# Patient Record
Sex: FEMALE | Race: BLACK OR AFRICAN AMERICAN | Employment: OTHER | RURAL
[De-identification: names, ages, dates, MRNs, and addresses within clinical notes are randomized per-mention and may not be internally consistent; named-entity substitution may affect disease eponyms.]

---

## 2017-02-17 ENCOUNTER — OFFICE VISIT (OUTPATIENT)
Dept: FAMILY MEDICINE CLINIC | Age: 82
End: 2017-02-17

## 2017-02-17 VITALS
BODY MASS INDEX: 23.09 KG/M2 | HEART RATE: 75 BPM | WEIGHT: 117.6 LBS | SYSTOLIC BLOOD PRESSURE: 138 MMHG | DIASTOLIC BLOOD PRESSURE: 58 MMHG | OXYGEN SATURATION: 97 % | RESPIRATION RATE: 16 BRPM | TEMPERATURE: 97.6 F | HEIGHT: 60 IN

## 2017-02-17 DIAGNOSIS — I25.2 HISTORY OF MYOCARDIAL INFARCT AT AGE GREATER THAN 60 YEARS: Primary | ICD-10-CM

## 2017-02-17 DIAGNOSIS — I10 ESSENTIAL HYPERTENSION: ICD-10-CM

## 2017-02-17 DIAGNOSIS — E78.00 PURE HYPERCHOLESTEROLEMIA: ICD-10-CM

## 2017-02-17 RX ORDER — ASPIRIN 81 MG/1
TABLET ORAL DAILY
COMMUNITY
End: 2017-08-14 | Stop reason: SDUPTHER

## 2017-02-17 NOTE — PROGRESS NOTES
Danielle Mason is a 80 y.o. female presenting for/with:    Medication Evaluation    HPI:  Hypertension. Blood pressures have been stable. Management at last visit included continuing current regimen, but pt has been feeling orthostatic with use of diovan, so has been holding that for past couple weeks. Current regimen: (angiotensin II receptor antagonist- held), beta-blocker, thiazide, and calcium channel blocker. Symptoms include mild orthostasis, not when off . Patient denies chest pain, palpitations, peripheral edema. Lab review:   Lab Results   Component Value Date/Time    Sodium 142 12/06/2016 09:46 AM    Potassium 4.0 12/06/2016 09:46 AM    Chloride 102 12/06/2016 09:46 AM    CO2 26 12/06/2016 09:46 AM    Glucose 89 12/06/2016 09:46 AM    BUN 29 12/06/2016 09:46 AM    Creatinine 1.46 12/06/2016 09:46 AM    BUN/Creatinine ratio 20 12/06/2016 09:46 AM    GFR est AA 39 12/06/2016 09:46 AM    GFR est non-AA 34 12/06/2016 09:46 AM    Calcium 10.1 12/06/2016 09:46 AM     Hyperlipidemia and ASCVD. On lipitor 40. Taran well. No myalgias, arthralgias, unusual weakness. Lab Results   Component Value Date/Time    Cholesterol, total 196 10/12/2016 08:28 AM    HDL Cholesterol 79 10/12/2016 08:28 AM    LDL, calculated 99 10/12/2016 08:28 AM    VLDL, calculated 18 10/12/2016 08:28 AM    Triglyceride 92 10/12/2016 08:28 AM       Lab Results   Component Value Date/Time    ALT (SGPT) 20 10/12/2016 08:28 AM    AST (SGOT) 24 01/04/2016 10:15 AM    Alk. phosphatase 94 01/04/2016 10:15 AM    Bilirubin, total 0.3 01/04/2016 10:15 AM     PMH, SH, Medications/Allergies: reviewed, on chart.     ROS:  Constitutional: No fever, chills or weight loss  Respiratory: No cough, SOB   CV: No chest pain or Palpitations    Visit Vitals    /58 (BP 1 Location: Right arm, BP Patient Position: Sitting)    Pulse 75    Temp 97.6 °F (36.4 °C) (Oral)    Resp 16    Ht 5' (1.524 m)    Wt 117 lb 9.6 oz (53.3 kg)    SpO2 97%    BMI 22.97 kg/m2     Wt Readings from Last 3 Encounters:   02/17/17 117 lb 9.6 oz (53.3 kg)   12/06/16 119 lb (54 kg)   11/29/16 119 lb (54 kg)     BP Readings from Last 3 Encounters:   02/17/17 138/58   12/06/16 120/58   11/29/16 109/54       Physical Examination: General appearance - alert, well appearing, and in no distress  Mental status - alert, oriented to person, place, and time  Eyes - pupils equal and reactive, extraocular eye movements intact  ENT - bilateral external ears and nose normal. Normal lips  Neck - supple, no significant adenopathy, no thyromegaly or mass  Lymphatics - no palpable lymphadenopathy, no hepatosplenomegaly  Chest - clear to auscultation, no wheezes, rales or rhonchi, symmetric air entry  Heart - normal rate, regular rhythm, normal S1, S2, no murmurs, rubs, clicks or gallops  Extremities - peripheral pulses normal, no pedal edema, no clubbing or cyanosis    A/p:  HTN  overcontrolled with orthostasis on diovan. Hold that. con't rest of pills. Check jamila/cr and BMP for lytes, GFR.    HLD/ASCVD  well controlled. con't current tx.     F/U 6mo/PRN

## 2017-02-17 NOTE — MR AVS SNAPSHOT
Visit Information Date & Time Provider Department Dept. Phone Encounter #  
 2/17/2017  8:30 AM Alysia Reynoso MD [address] [place] [address] [id number] Follow-up Instructions Return in about 6 months (around 8/17/2017). Follow-up and Disposition History Upcoming Health Maintenance Date Due DTaP/Tdap/Td series (1 - Tdap) 6/27/1956 ZOSTER VACCINE AGE 60> 6/27/1995 GLAUCOMA SCREENING Q2Y 6/27/2000 Pneumococcal 65+ Low/Medium Risk (2 of 2 - PPSV23) 10/13/2017 MEDICARE YEARLY EXAM 10/13/2017 BREAST CANCER SCRN MAMMOGRAM 11/11/2017 Allergies as of 2/17/2017  Review Complete On: 2/17/2017 By: Alysia Reynoso MD  
  
 Severity Noted Reaction Type Reactions Statins-hmg-coa Reductase Inhibitors  03/24/2014    Unable to Obtain Unknown which one, tolerates Atorvastatin Synthroid [Levothyroxine]  01/04/2016    Rash Current Immunizations  Reviewed on 10/21/2013 Name Date Influenza Vaccine 9/19/2016, 10/15/2015, 9/29/2014, 10/21/2013 Pneumococcal Conjugate (PCV-13) 10/13/2016 Tetanus Toxoid, Adsorbed 5/6/2015 Not reviewed this visit You Were Diagnosed With   
  
 Codes Comments History of myocardial infarct at age greater than 61 years    -  Primary ICD-10-CM: I25.2 ICD-9-CM: 419 Pure hypercholesterolemia     ICD-10-CM: E78.00 ICD-9-CM: 272.0 Essential hypertension     ICD-10-CM: I10 
ICD-9-CM: 401.9 Vitals BP Pulse Temp Resp Height(growth percentile) Weight(growth percentile) 138/58 (BP 1 Location: Right arm, BP Patient Position: Sitting) 75 97.6 °F (36.4 °C) (Oral) 16 5' (1.524 m) 117 lb 9.6 oz (53.3 kg) SpO2 BMI OB Status Smoking Status 97% 22.97 kg/m2 Postmenopausal Former Smoker BMI and BSA Data Body Mass Index Body Surface Area  
 22.97 kg/m 2 1.5 m 2 Preferred Pharmacy Pharmacy Name Phone [facility], [facility] [address] [facility] [address] [phone] Your Updated Medication List  
  
   
This list is accurate as of: 2/17/17  9:24 AM.  Always use your most recent med list. amLODIPine 10 mg tablet Commonly known as:  Adelita Briseyda Take 1 Tab by mouth daily. aspirin delayed-release 81 mg tablet Take  by mouth daily. atenolol 25 mg tablet Commonly known as:  TENORMIN Take 1 Tab by mouth daily. Indications: heart  
  
 atorvastatin 40 mg tablet Commonly known as:  LIPITOR  
TAKE 1 TABLET EVERY DAY FOR CHOLESTEROL, HEART  
  
 CENTRUM SILVER ULTRA WOMEN'S Tab tablet Generic drug:  multivit-mineral-iron-lutein Take 1 Tab by mouth daily. hydroCHLOROthiazide 25 mg tablet Commonly known as:  HYDRODIURIL Take 1 Tab by mouth daily. We Performed the Following METABOLIC PANEL, BASIC [30686 CPT(R)] MICROALBUMIN, UR, RAND W/ MICROALBUMIN/CREA RATIO [id number] CPT(R)] Follow-up Instructions Return in about 6 months (around 8/17/2017). Patient Instructions Put the valsartan on hold for now, your pressure doesn't seem to need it. Introducing [facility]! [employer] introduces [employer] patient portal. Now you can access parts of your medical record, email your doctor's office, and request medication refills online. 1. In your internet browser, go to [email or web address] 2. Click on the First Time User? Click Here link in the Sign In box. You will see the New Member Sign Up page. 3. Enter your [employer] Access Code exactly as it appears below. You will not need to use this code after youve completed the sign-up process. If you do not sign up before the expiration date, you must request a new code. · [employer] Access Code: [email or web address] Expires: 5/18/2017  9:14 AM 
 
4.  Enter the last four digits of your Social Security Number (xxxx) and Date of Birth (mm/dd/yyyy) as indicated and click Submit. You will be taken to the next sign-up page. 5. Create a [employer] ID. This will be your [employer] login ID and cannot be changed, so think of one that is secure and easy to remember. 6. Create a [employer] password. You can change your password at any time. 7. Enter your Password Reset Question and Answer. This can be used at a later time if you forget your password. 8. Enter your e-mail address. You will receive e-mail notification when new information is available in [address] 9. Click Sign Up. You can now view and download portions of your medical record. 10. Click the Download Summary menu link to download a portable copy of your medical information. If you have questions, please visit the Frequently Asked Questions section of the [employer] website. Remember, [employer] is NOT to be used for urgent needs. For medical emergencies, dial 911. Now available from your iPhone and Android! Please provide this summary of care documentation to your next provider. Your primary care clinician is listed as Kevin Rouse. If you have any questions after today's visit, please call [phone].

## 2017-02-18 LAB
ALBUMIN/CREAT UR: 12.9 MG/G CREAT (ref 0–30)
BUN SERPL-MCNC: 18 MG/DL (ref 8–27)
BUN/CREAT SERPL: 12 (ref 11–26)
CALCIUM SERPL-MCNC: 9.9 MG/DL (ref 8.7–10.3)
CHLORIDE SERPL-SCNC: 101 MMOL/L (ref 96–106)
CO2 SERPL-SCNC: 25 MMOL/L (ref 18–29)
CREAT SERPL-MCNC: 1.53 MG/DL (ref 0.57–1)
CREAT UR-MCNC: 173.8 MG/DL
GLUCOSE SERPL-MCNC: 95 MG/DL (ref 65–99)
MICROALBUMIN UR-MCNC: 22.4 UG/ML
POTASSIUM SERPL-SCNC: 3.6 MMOL/L (ref 3.5–5.2)
SODIUM SERPL-SCNC: 143 MMOL/L (ref 134–144)

## 2017-04-25 DIAGNOSIS — I10 BENIGN ESSENTIAL HTN: ICD-10-CM

## 2017-04-25 RX ORDER — AMLODIPINE BESYLATE 5 MG/1
5 TABLET ORAL DAILY
Qty: 90 TAB | Refills: 3 | Status: SHIPPED | OUTPATIENT
Start: 2017-04-25 | End: 2017-04-27 | Stop reason: SDUPTHER

## 2017-04-27 DIAGNOSIS — I10 BENIGN ESSENTIAL HTN: ICD-10-CM

## 2017-04-27 RX ORDER — AMLODIPINE BESYLATE 5 MG/1
5 TABLET ORAL DAILY
Qty: 90 TAB | Refills: 3 | Status: SHIPPED | OUTPATIENT
Start: 2017-04-27 | End: 2018-05-07 | Stop reason: SDUPTHER

## 2017-05-15 ENCOUNTER — OFFICE VISIT (OUTPATIENT)
Dept: FAMILY MEDICINE CLINIC | Age: 82
End: 2017-05-15

## 2017-05-15 VITALS
TEMPERATURE: 98.2 F | SYSTOLIC BLOOD PRESSURE: 140 MMHG | HEIGHT: 60 IN | OXYGEN SATURATION: 100 % | RESPIRATION RATE: 16 BRPM | WEIGHT: 123 LBS | BODY MASS INDEX: 24.15 KG/M2 | HEART RATE: 67 BPM | DIASTOLIC BLOOD PRESSURE: 49 MMHG

## 2017-05-15 DIAGNOSIS — I25.10 ASCVD (ARTERIOSCLEROTIC CARDIOVASCULAR DISEASE): ICD-10-CM

## 2017-05-15 DIAGNOSIS — I10 ESSENTIAL HYPERTENSION: ICD-10-CM

## 2017-05-15 DIAGNOSIS — E78.00 PURE HYPERCHOLESTEROLEMIA: ICD-10-CM

## 2017-05-15 DIAGNOSIS — H25.9 AGE-RELATED CATARACT OF BOTH EYES, UNSPECIFIED AGE-RELATED CATARACT TYPE: Primary | ICD-10-CM

## 2017-05-15 NOTE — MR AVS SNAPSHOT
Visit Information Date & Time Provider Department Dept. Phone Encounter #  
 5/15/2017  2:00 PM Raul Mahajan, [address] [id number] Follow-up Instructions Return in about 6 months (around 11/15/2017). Follow-up and Disposition History Your Appointments 8/14/2017  9:10 AM  
ESTABLISHED PATIENT with Raul Mahajan MD  
[facility] Appt Note: 6 MO F/U  
 [address] [facility] [address] [facility],5Th Floor [address] [phone]  
  
   
 [address] [facility] [address] [place], [place] [address]ia [facility],5Th Floor [address] Upcoming Health Maintenance Date Due DTaP/Tdap/Td series (1 - Tdap) 6/27/1956 ZOSTER VACCINE AGE 60> 6/27/1995 GLAUCOMA SCREENING Q2Y 6/27/2000 INFLUENZA AGE 9 TO ADULT 8/1/2017 Pneumococcal 65+ Low/Medium Risk (2 of 2 - PPSV23) 10/13/2017 MEDICARE YEARLY EXAM 10/13/2017 BREAST CANCER SCRN MAMMOGRAM 11/11/2017 Allergies as of 5/15/2017  Review Complete On: 5/15/2017 By: Raul Mahajan MD  
  
 Severity Noted Reaction Type Reactions Statins-hmg-coa Reductase Inhibitors  03/24/2014    Unable to Obtain Unknown which one, tolerates Atorvastatin Synthroid [Levothyroxine]  01/04/2016    Rash Current Immunizations  Reviewed on 10/21/2013 Name Date Influenza Vaccine 9/19/2016, 10/15/2015, 9/29/2014, 10/21/2013 Pneumococcal Conjugate (PCV-13) 10/13/2016 Tetanus Toxoid, Adsorbed 5/6/2015 Not reviewed this visit You Were Diagnosed With   
  
 Codes Comments Age-related cataract of both eyes, unspecified age-related cataract type    -  Primary ICD-10-CM: H25.9 ICD-9-CM: 366.10 ASCVD (arteriosclerotic cardiovascular disease)     ICD-10-CM: I25.10 ICD-9-CM: 429.2, 440.9 Pure hypercholesterolemia     ICD-10-CM: E78.00 ICD-9-CM: 272.0 Essential hypertension     ICD-10-CM: I10 
ICD-9-CM: 401.9 Vitals BP Pulse Temp Resp Height(growth percentile) Weight(growth percentile) 140/49 (BP 1 Location: Right arm, BP Patient Position: Sitting) 67 98.2 °F (36.8 °C) (Oral) 16 5' (1.524 m) 123 lb (55.8 kg) SpO2 BMI OB Status Smoking Status 100% 24.02 kg/m2 Postmenopausal Former Smoker BMI and BSA Data Body Mass Index Body Surface Area 24.02 kg/m 2 1.54 m 2 Preferred Pharmacy Pharmacy Name Phone [facility] [address] [place] [address] [address] [facility] [address] [phone] Your Updated Medication List  
  
   
This list is accurate as of: 5/15/17  2:59 PM.  Always use your most recent med list. amLODIPine 5 mg tablet Commonly known as:  Abbey Dural Take 1 Tab by mouth daily. Indications: pressure and heart  
  
 aspirin delayed-release 81 mg tablet Take  by mouth daily. atenolol 25 mg tablet Commonly known as:  TENORMIN Take 1 Tab by mouth daily. Indications: heart  
  
 atorvastatin 40 mg tablet Commonly known as:  LIPITOR  
TAKE 1 TABLET EVERY DAY FOR CHOLESTEROL, HEART  
  
 CENTRUM SILVER ULTRA WOMEN'S Tab tablet Generic drug:  multivit-mineral-iron-lutein Take 1 Tab by mouth daily. hydroCHLOROthiazide 25 mg tablet Commonly known as:  HYDRODIURIL Take 1 Tab by mouth daily. We Performed the Following AMB POC EKG ROUTINE W/ 12 LEADS, INTER & REP [46057 CPT(R)] Follow-up Instructions Return in about 6 months (around 11/15/2017). Introducing [address]! [facility] introduces [employer] patient portal. Now you can access parts of your medical record, email your doctor's office, and request medication refills online. 1. In your internet browser, go to [email or web address] 2. Click on the First Time User? Click Here link in the Sign In box. You will see the New Member Sign Up page. 3. Enter your [employer] Access Code exactly as it appears below.  You will not need to use this code after youve completed the sign-up process. If you do not sign up before the expiration date, you must request a new code. · [employer] Access Code: [email or web address] Expires: 5/18/2017 10:14 AM 
 
4. Enter the last four digits of your Social Security Number (xxxx) and Date of Birth (mm/dd/yyyy) as indicated and click Submit. You will be taken to the next sign-up page. 5. Create a [employer] ID. This will be your [employer] login ID and cannot be changed, so think of one that is secure and easy to remember. 6. Create a [employer] password. You can change your password at any time. 7. Enter your Password Reset Question and Answer. This can be used at a later time if you forget your password. 8. Enter your e-mail address. You will receive e-mail notification when new information is available in [address] 9. Click Sign Up. You can now view and download portions of your medical record. 10. Click the Download Summary menu link to download a portable copy of your medical information. If you have questions, please visit the Frequently Asked Questions section of the [employer] website. Remember, [employer] is NOT to be used for urgent needs. For medical emergencies, dial 911. Now available from your iPhone and Android! Please provide this summary of care documentation to your next provider. Your primary care clinician is listed as Irene Rouse. If you have any questions after today's visit, please call [phone].

## 2017-05-15 NOTE — PROGRESS NOTES
Lisa Freedman is a 80 y.o. female presenting for/with:    Pre-op Exam (eye Dr Celena Ashley)    HPI:  Cataracts  Pt having gradually worsening visual acuity. Seeing Celena Ashley. Rec Phaco/IOL. Plans at [facility] 6/5/17 on R eye and the following week for L eye if goes well. Hypertension. Blood pressures have been stable. Management at last visit included d/c diovan due to orthostasis. No further problems with that sx. Current regimen: beta-blocker, thiazide, and calcium channel blocker. Symptoms include no sx. Patient denies chest pain, palpitations, peripheral edema. Lab review:     Results for orders placed or performed in visit on 43/77/92   METABOLIC PANEL, BASIC   Result Value Ref Range    Glucose 95 65 - 99 mg/dL    BUN 18 8 - 27 mg/dL    Creatinine 1.53 (H) 0.57 - 1.00 mg/dL    GFR est non-AA 32 (L) >59 mL/min/1.73    GFR est AA 37 (L) >59 mL/min/1.73    BUN/Creatinine ratio 12 11 - 26    Sodium 143 134 - 144 mmol/L    Potassium 3.6 3.5 - 5.2 mmol/L    Chloride 101 96 - 106 mmol/L    CO2 25 18 - 29 mmol/L    Calcium 9.9 8.7 - 10.3 mg/dL   MICROALBUMIN, UR, RAND W/ MICROALBUMIN/CREA RATIO   Result Value Ref Range    Creatinine, urine 173.8 Not Estab. mg/dL    Microalbumin, urine 22.4 Not Estab. ug/mL    Microalb/Creat ratio (ug/mg creat.) 12.9 0.0 - 30.0 mg/g creat       Hyperlipidemia and ASCVD. On lipitor 40. Taran well. No myalgias, arthralgias, unusual weakness. Lab Results   Component Value Date/Time    Cholesterol, total 196 10/12/2016 08:28 AM    HDL Cholesterol 79 10/12/2016 08:28 AM    LDL, calculated 99 10/12/2016 08:28 AM    VLDL, calculated 18 10/12/2016 08:28 AM    Triglyceride 92 10/12/2016 08:28 AM       Lab Results   Component Value Date/Time    ALT (SGPT) 20 10/12/2016 08:28 AM    AST (SGOT) 24 01/04/2016 10:15 AM    Alk.  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 She does not have any smokeless tobacco history on file. She reports that she does not drink alcohol. Family History   Problem Relation Age of Onset    Heart Disease Mother     Stroke Father        Current Outpatient Prescriptions   Medication Sig    amLODIPine (NORVASC) 5 mg tablet Take 1 Tab by mouth daily. Indications: pressure and heart    aspirin delayed-release 81 mg tablet Take  by mouth daily.  hydroCHLOROthiazide (HYDRODIURIL) 25 mg tablet Take 1 Tab by mouth daily.  atorvastatin (LIPITOR) 40 mg tablet TAKE 1 TABLET EVERY DAY FOR CHOLESTEROL, HEART    multivit-mineral-iron-lutein (CENTRUM SILVER ULTRA WOMEN'S) tab tablet Take 1 Tab by mouth daily.  atenolol (TENORMIN) 25 mg tablet Take 1 Tab by mouth daily. Indications: heart     No current facility-administered medications for this visit.         Allergies   Allergen Reactions    Statins-Hmg-Coa Reductase Inhibitors Unable to Obtain     Unknown which one, tolerates Atorvastatin    Synthroid [Levothyroxine] Rash     ROS:  Constitutional: No fever, chills or weight loss  Respiratory: No cough, SOB   CV: No chest pain or Palpitations    Visit Vitals    /49 (BP 1 Location: Right arm, BP Patient Position: Sitting)    Pulse 67    Temp 98.2 °F (36.8 °C) (Oral)    Resp 16    Ht 5' (1.524 m)    Wt 123 lb (55.8 kg)    SpO2 100%    BMI 24.02 kg/m2     Wt Readings from Last 3 Encounters:   05/15/17 123 lb (55.8 kg)   02/17/17 117 lb 9.6 oz (53.3 kg)   12/06/16 119 lb (54 kg)     BP Readings from Last 3 Encounters: 05/15/17 140/49   02/17/17 138/58   12/06/16 120/58     Physical Examination: General appearance - alert, well appearing, and in no distress  Mental status - alert, oriented to person, place, and time  Eyes - pupils equal and reactive, extraocular eye movements intact  ENT - bilateral external ears and nose normal. Normal lips  Neck - supple, no significant adenopathy, no thyromegaly or mass  Lymphatics - no palpable lymphadenopathy, no hepatosplenomegaly  Chest - clear to auscultation, no wheezes, rales or rhonchi, symmetric air entry  Heart - normal rate, regular rhythm, normal S1, S2, no murmurs, rubs, clicks or gallops  Extremities - peripheral pulses normal, no pedal edema, no clubbing or cyanosis    EKG: NSR, no ischemic changes. A/p:  Cataracts  Optimized for surgery. Recommend proceeding as planned. HTN  Good control now. No further orthostasis. Con't current tx. jamila/cr and BMP for lytes, GFR were good 2/2017. HLD/ASCVD  well controlled. con't current tx.      F/U 6mo/PRN

## 2017-06-04 PROBLEM — H25.11 AGE-RELATED NUCLEAR CATARACT, RIGHT EYE: Status: ACTIVE | Noted: 2017-06-04

## 2017-06-05 PROBLEM — H25.11 AGE-RELATED NUCLEAR CATARACT, RIGHT EYE: Status: RESOLVED | Noted: 2017-06-04 | Resolved: 2017-06-05

## 2017-06-14 DIAGNOSIS — I10 BENIGN ESSENTIAL HTN: ICD-10-CM

## 2017-06-14 RX ORDER — HYDROCHLOROTHIAZIDE 25 MG/1
25 TABLET ORAL DAILY
Qty: 90 TAB | Refills: 3 | Status: SHIPPED | OUTPATIENT
Start: 2017-06-14 | End: 2018-09-17 | Stop reason: SDUPTHER

## 2017-08-14 ENCOUNTER — OFFICE VISIT (OUTPATIENT)
Dept: FAMILY MEDICINE CLINIC | Age: 82
End: 2017-08-14

## 2017-08-14 VITALS
DIASTOLIC BLOOD PRESSURE: 55 MMHG | TEMPERATURE: 97.6 F | OXYGEN SATURATION: 97 % | WEIGHT: 126 LBS | RESPIRATION RATE: 16 BRPM | SYSTOLIC BLOOD PRESSURE: 133 MMHG | HEIGHT: 60 IN | HEART RATE: 69 BPM | BODY MASS INDEX: 24.74 KG/M2

## 2017-08-14 DIAGNOSIS — E78.00 PURE HYPERCHOLESTEROLEMIA: ICD-10-CM

## 2017-08-14 DIAGNOSIS — I25.10 CORONARY ARTERY DISEASE INVOLVING NATIVE CORONARY ARTERY OF NATIVE HEART WITHOUT ANGINA PECTORIS: ICD-10-CM

## 2017-08-14 DIAGNOSIS — I25.2 HISTORY OF MYOCARDIAL INFARCT AT AGE GREATER THAN 60 YEARS: Primary | ICD-10-CM

## 2017-08-14 DIAGNOSIS — I10 ESSENTIAL HYPERTENSION: ICD-10-CM

## 2017-08-14 RX ORDER — ASPIRIN 81 MG/1
81 TABLET ORAL DAILY
Qty: 100 TAB | Refills: 3
Start: 2017-08-14 | End: 2018-11-26 | Stop reason: SDUPTHER

## 2017-08-14 RX ORDER — ATORVASTATIN CALCIUM 40 MG/1
TABLET, FILM COATED ORAL
Qty: 90 TAB | Refills: 3 | Status: SHIPPED | OUTPATIENT
Start: 2017-08-14 | End: 2018-08-29 | Stop reason: SDUPTHER

## 2017-08-14 NOTE — PROGRESS NOTES
Devon Chavez is a 80 y.o. female presenting for/with:    Hypertension (check up)    HPI:  Cataracts  Pt has much better visual acuity. Seeing Gus Reyezer. Had routine Phaco/IOL. Had some pressure issues, tx with drops TID. Plans f/u next week. Hypertension. Blood pressures have been stable. Management at last visit included con't current tx. No further problems with that sx. Current regimen: beta-blocker, thiazide, and calcium channel blocker. Symptoms include no sx. Patient denies chest pain, palpitations, peripheral edema. Lab review:     Results for orders placed or performed in visit on 76/24/60   METABOLIC PANEL, BASIC   Result Value Ref Range    Glucose 95 65 - 99 mg/dL    BUN 18 8 - 27 mg/dL    Creatinine 1.53 (H) 0.57 - 1.00 mg/dL    GFR est non-AA 32 (L) >59 mL/min/1.73    GFR est AA 37 (L) >59 mL/min/1.73    BUN/Creatinine ratio 12 11 - 26    Sodium 143 134 - 144 mmol/L    Potassium 3.6 3.5 - 5.2 mmol/L    Chloride 101 96 - 106 mmol/L    CO2 25 18 - 29 mmol/L    Calcium 9.9 8.7 - 10.3 mg/dL   MICROALBUMIN, UR, RAND W/ MICROALBUMIN/CREA RATIO   Result Value Ref Range    Creatinine, urine 173.8 Not Estab. mg/dL    Microalbumin, urine 22.4 Not Estab. ug/mL    Microalb/Creat ratio (ug/mg creat.) 12.9 0.0 - 30.0 mg/g creat       Hyperlipidemia and ASCVD. On lipitor 40. Taran well. No myalgias, arthralgias, unusual weakness. Lab Results   Component Value Date/Time    Cholesterol, total 196 10/12/2016 08:28 AM    HDL Cholesterol 79 10/12/2016 08:28 AM    LDL, calculated 99 10/12/2016 08:28 AM    VLDL, calculated 18 10/12/2016 08:28 AM    Triglyceride 92 10/12/2016 08:28 AM       Lab Results   Component Value Date/Time    ALT (SGPT) 20 10/12/2016 08:28 AM    AST (SGOT) 24 01/04/2016 10:15 AM    Alk. phosphatase 94 01/04/2016 10:15 AM    Bilirubin, total 0.3 01/04/2016 10:15 AM     PMH, SH, Medications/Allergies: reviewed, on chart.     ROS:  Constitutional: No fever, chills or weight loss  Respiratory: No cough, SOB   CV: No chest pain or Palpitations    Visit Vitals    /55    Pulse 69    Temp 97.6 °F (36.4 °C) (Temporal)    Resp 16    Ht 5' (1.524 m)    Wt 126 lb (57.2 kg)    SpO2 97%    BMI 24.61 kg/m2     Wt Readings from Last 3 Encounters:   08/14/17 126 lb (57.2 kg)   06/05/17 123 lb (55.8 kg)   05/15/17 123 lb (55.8 kg)     BP Readings from Last 3 Encounters:   08/14/17 133/55   06/05/17 104/51   05/15/17 140/49     Physical Examination: General appearance - alert, well appearing, and in no distress  Mental status - alert, oriented to person, place, and time  Eyes - pupils equal and reactive, extraocular eye movements intact  ENT - bilateral external ears and nose normal. Normal lips  Neck - supple, no significant adenopathy, no thyromegaly or mass  Lymphatics - no palpable lymphadenopathy, no hepatosplenomegaly  Chest - clear to auscultation, no wheezes, rales or rhonchi, symmetric air entry  Heart - normal rate, regular rhythm, normal S1, S2, no murmurs, rubs, clicks or gallops  Extremities - peripheral pulses normal, no pedal edema, no clubbing or cyanosis    A/p:  HTN  Good control. No sx. Con't current tx. recheck CMP.    HLD/ASCVD  well controlled. con't current tx. Recheck lipids. HCM  Plan Pneumovax 23 and flu shot mid OCT.   F/U 6mo/PRN

## 2017-08-14 NOTE — PATIENT INSTRUCTIONS
Keep up the good work! If you have any questions regarding [facility], you may call [employer] support at [phone].

## 2017-08-14 NOTE — MR AVS SNAPSHOT
Visit Information Date & Time Provider Department Dept. Phone Encounter #  
 8/14/2017  9:10 AM Tamy Lorenzo MD [address] [place] [id number] Follow-up Instructions Return in about 6 months (around 2/14/2018). Follow-up and Disposition History Your Appointments 10/9/2017 11:10 AM  
ESTABLISHED PATIENT with Tamy Lorenzo MD  
Danny [address] ([address]) Appt Note: Wellness  visit 1000 [facility] [address] [address] [phone]  
  
   
 1000 [facility] [address] [address] Upcoming Health Maintenance Date Due ZOSTER VACCINE AGE 60> 4/27/1995 BREAST CANCER SCRN MAMMOGRAM 11/11/2017 Pneumococcal 65+ Low/Medium Risk (2 of 2 - PPSV23) 10/13/2017 MEDICARE YEARLY EXAM 10/13/2017 GLAUCOMA SCREENING Q2Y 8/14/2019 DTaP/Tdap/Td series (2 - Td) 8/14/2027 Allergies as of 8/14/2017  Review Complete On: 8/14/2017 By: Tamy Lorenzo MD  
  
 Severity Noted Reaction Type Reactions Olanzapine  04/30/2010    Unknown (comments) Statins-hmg-coa Reductase Inhibitors  03/24/2014    Unable to Obtain Patient can tolerate atorvastatin Unknown which one, tolerates Atorvastatin Synthroid [Levothyroxine]  01/04/2016    Rash Current Immunizations  Reviewed on 10/21/2013 Name Date Influenza Vaccine 9/19/2016, 10/15/2015, 9/29/2014, 10/21/2013 Pneumococcal Conjugate (PCV-13) 10/13/2016 Tetanus Toxoid, Adsorbed 5/6/2015 Not reviewed this visit You Were Diagnosed With   
  
 Codes Comments History of myocardial infarct at age greater than 61 years    -  Primary ICD-10-CM: I25.2 ICD-9-CM: 540 Essential hypertension     ICD-10-CM: I10 
ICD-9-CM: 401.9 Pure hypercholesterolemia     ICD-10-CM: E78.00 ICD-9-CM: 272.0 Coronary artery disease involving native coronary artery of native heart without angina pectoris     ICD-10-CM: I25.10 ICD-9-CM: 414.01 Vitals BP Pulse Temp Resp Height(growth percentile) Weight(growth percentile) 133/55 69 97.6 °F (36.4 °C) (Temporal) 16 5' (1.524 m) 126 lb (57.2 kg) SpO2 BMI OB Status Smoking Status 97% 24.61 kg/m2 Postmenopausal Former Smoker BMI and BSA Data Body Mass Index Body Surface Area  
 24.61 kg/m 2 1.56 m 2 Preferred Pharmacy Pharmacy Name Phone Del[facility] Mares [address] [place], [place] [id number] [facility] [address] [facility] [phone] Your Updated Medication List  
  
   
This list is accurate as of: 8/14/17  9:50 AM.  Always use your most recent med list. amLODIPine 5 mg tablet Commonly known as:  Israel Matar Take 1 Tab by mouth daily. Indications: pressure and heart  
  
 aspirin delayed-release 81 mg tablet Take 1 Tab by mouth daily. atenolol 25 mg tablet Commonly known as:  TENORMIN Take 1 Tab by mouth daily. Indications: heart  
  
 atorvastatin 40 mg tablet Commonly known as:  LIPITOR  
TAKE 1 TABLET EVERY DAY FOR CHOLESTEROL, HEART  
  
 CENTRUM SILVER ULTRA WOMEN'S Tab tablet Generic drug:  multivit-mineral-iron-lutein Take 1 Tab by mouth daily. hydroCHLOROthiazide 25 mg tablet Commonly known as:  HYDRODIURIL Take 1 Tab by mouth daily. Indications: pressure Prescriptions Sent to Pharmacy Refills  
 atorvastatin (LIPITOR) 40 mg tablet 3 Sig: TAKE 1 TABLET EVERY DAY FOR CHOLESTEROL, HEART Class: Normal  
 Pharmacy: [address] [place] [address], [address] [place] [address] #: [phone] We Performed the Following LIPID PANEL [14045 CPT(R)] METABOLIC PANEL, COMPREHENSIVE [00459 CPT(R)] Follow-up Instructions Return in about 6 months (around 2/14/2018). Patient Instructions Keep up the good work! If you have any questions regarding [employer], you may call [employer] support at [phone]. Patient Instructions History Introducing hospitals & HEALTH SERVICES! [employer] introduces [facility] patient portal. Now you can access parts of your medical record, email your doctor's office, and request medication refills online. 1. In your internet browser, go to [email or web address] 2. Click on the First Time User? Click Here link in the Sign In box. You will see the New Member Sign Up page. 3. Enter your [facility] [place] Access Code exactly as it appears below. You will not need to use this code after youve completed the sign-up process. If you do not sign up before the expiration date, you must request a new code. · [facility] Access Code: [email or web address] Expires: 9/3/2017  8:43 AM 
 
4. Enter the last four digits of your Social Security Number (xxxx) and Date of Birth (mm/dd/yyyy) as indicated and click Submit. You will be taken to the next sign-up page. 5. Create a [facility] [place] ID. This will be your [facility] login ID and cannot be changed, so think of one that is secure and easy to remember. 6. Create a [place] password. You can change your password at any time. 7. Enter your Password Reset Question and Answer. This can be used at a later time if you forget your password. 8. Enter your e-mail address. You will receive e-mail notification when new information is available in [address] 9. Click Sign Up. You can now view and download portions of your medical record. 10. Click the Download Summary menu link to download a portable copy of your medical information. If you have questions, please visit the Frequently Asked Questions section of the [facility] website. Remember, [facility] is NOT to be used for urgent needs. For medical emergencies, dial 911. Now available from your iPhone and Android! Please provide this summary of care documentation to your next provider. Your primary care clinician is listed as Raudel Rouse. If you have any questions after today's visit, please call [phone].

## 2017-08-15 LAB
ALBUMIN SERPL-MCNC: 4.6 G/DL (ref 3.5–4.7)
ALBUMIN/GLOB SERPL: 1.7 {RATIO} (ref 1.2–2.2)
ALP SERPL-CCNC: 98 IU/L (ref 39–117)
ALT SERPL-CCNC: 16 IU/L (ref 0–32)
AST SERPL-CCNC: 24 IU/L (ref 0–40)
BILIRUB SERPL-MCNC: 0.3 MG/DL (ref 0–1.2)
BUN SERPL-MCNC: 27 MG/DL (ref 8–27)
BUN/CREAT SERPL: 18 (ref 12–28)
CALCIUM SERPL-MCNC: 10 MG/DL (ref 8.7–10.3)
CHLORIDE SERPL-SCNC: 100 MMOL/L (ref 96–106)
CHOLEST SERPL-MCNC: 226 MG/DL (ref 100–199)
CO2 SERPL-SCNC: 27 MMOL/L (ref 18–29)
CREAT SERPL-MCNC: 1.47 MG/DL (ref 0.57–1)
GLOBULIN SER CALC-MCNC: 2.7 G/DL (ref 1.5–4.5)
GLUCOSE SERPL-MCNC: 76 MG/DL (ref 65–99)
HDLC SERPL-MCNC: 69 MG/DL
LDLC SERPL CALC-MCNC: 127 MG/DL (ref 0–99)
POTASSIUM SERPL-SCNC: 4.1 MMOL/L (ref 3.5–5.2)
PROT SERPL-MCNC: 7.3 G/DL (ref 6–8.5)
SODIUM SERPL-SCNC: 144 MMOL/L (ref 134–144)
TRIGL SERPL-MCNC: 151 MG/DL (ref 0–149)
VLDLC SERPL CALC-MCNC: 30 MG/DL (ref 5–40)

## 2017-08-15 NOTE — PROGRESS NOTES
Cholesterol up, almost like pt not taking the cholesterol pill regularly. Kidneys better. Sugar, salt ok. Please take all pills regularly.

## 2017-10-23 ENCOUNTER — OFFICE VISIT (OUTPATIENT)
Dept: FAMILY MEDICINE CLINIC | Age: 82
End: 2017-10-23

## 2017-10-23 VITALS
DIASTOLIC BLOOD PRESSURE: 54 MMHG | WEIGHT: 126 LBS | HEIGHT: 60 IN | HEART RATE: 66 BPM | TEMPERATURE: 98.4 F | BODY MASS INDEX: 24.74 KG/M2 | SYSTOLIC BLOOD PRESSURE: 143 MMHG | OXYGEN SATURATION: 100 %

## 2017-10-23 DIAGNOSIS — Z13.31 SCREENING FOR DEPRESSION: ICD-10-CM

## 2017-10-23 DIAGNOSIS — Z00.00 MEDICARE ANNUAL WELLNESS VISIT, SUBSEQUENT: Primary | ICD-10-CM

## 2017-10-23 DIAGNOSIS — Z12.31 ENCOUNTER FOR SCREENING MAMMOGRAM FOR MALIGNANT NEOPLASM OF BREAST: ICD-10-CM

## 2017-10-23 DIAGNOSIS — Z13.39 SCREENING FOR ALCOHOLISM: ICD-10-CM

## 2017-10-23 DIAGNOSIS — Z23 ENCOUNTER FOR IMMUNIZATION: ICD-10-CM

## 2017-10-23 NOTE — MR AVS SNAPSHOT
Visit Information Date & Time Provider Department Dept. Phone Encounter #  
 10/23/2017  2:00 PM Wiley Trevino MD [address] [place] [address] [id number] Follow-up Instructions Return in about 3 months (around 1/23/2018). Follow-up and Disposition History Your Appointments 2/15/2018  9:10 AM  
ESTABLISHED PATIENT with Wiley Trevino MD  
[place] [address] ([address] [facility]) Appt Note: 6 mo f/u  
 1000 [place] [id number] [facility],[address] [address] [phone]  
  
   
 1000 [address] [address] Upcoming Health Maintenance Date Due ZOSTER VACCINE AGE 60> 4/27/1995 Pneumococcal 65+ Low/Medium Risk (2 of 2 - PPSV23) 10/13/2017 MEDICARE YEARLY EXAM 10/13/2017 BREAST CANCER SCRN MAMMOGRAM 11/11/2017 GLAUCOMA SCREENING Q2Y 8/14/2019 DTaP/Tdap/Td series (2 - Td) 8/14/2027 Allergies as of 10/23/2017  Review Complete On: 10/23/2017 By: Wiley Trevino MD  
  
 Severity Noted Reaction Type Reactions Olanzapine  04/30/2010    Unknown (comments) Statins-hmg-coa Reductase Inhibitors  03/24/2014    Unable to Obtain Patient can tolerate atorvastatin Unknown which one, tolerates Atorvastatin Synthroid [Levothyroxine]  01/04/2016    Rash Current Immunizations  Reviewed on 10/23/2017 Name Date Influenza Vaccine 9/19/2016, 10/15/2015, 9/29/2014, 10/21/2013 Pneumococcal Conjugate (PCV-13) 10/13/2016 Pneumococcal Polysaccharide (PPSV-23) 10/23/2017 Tetanus Toxoid, Adsorbed 5/6/2015 Reviewed by Chikis Suarez LPN on 04/46/9145 at  2:26 PM  
You Were Diagnosed With   
  
 Codes Comments Medicare annual wellness visit, subsequent    -  Primary ICD-10-CM: Z00.00 ICD-9-CM: V70.0 Encounter for immunization     ICD-10-CM: E10 ICD-9-CM: V03.89 Screening for alcoholism     ICD-10-CM: Z13.89 ICD-9-CM: V79.1  Screening for depression     ICD-10-CM: Z13.89 
 ICD-9-CM: V79.0 Encounter for screening mammogram for malignant neoplasm of breast     ICD-10-CM: Z12.31 
ICD-9-CM: V76.12 Vitals BP Pulse Temp Height(growth percentile) Weight(growth percentile) SpO2  
 143/54 66 98.4 °F (36.9 °C) (Temporal) 5' (1.524 m) 126 lb (57.2 kg) 100% BMI OB Status Smoking Status 24.61 kg/m2 Postmenopausal Former Smoker BMI and BSA Data Body Mass Index Body Surface Area  
 24.61 kg/m 2 1.56 m 2 Preferred Pharmacy Pharmacy Name Phone [place] [address] [facility] [address] [phone] Your Updated Medication List  
  
   
This list is accurate as of: 10/23/17  2:40 PM.  Always use your most recent med list. amLODIPine 5 mg tablet Commonly known as:  Nathan Mend Take 1 Tab by mouth daily. Indications: pressure and heart  
  
 aspirin delayed-release 81 mg tablet Take 1 Tab by mouth daily. atenolol 25 mg tablet Commonly known as:  TENORMIN Take 1 Tab by mouth daily. Indications: heart  
  
 atorvastatin 40 mg tablet Commonly known as:  LIPITOR  
TAKE 1 TABLET EVERY DAY FOR CHOLESTEROL, HEART  
  
 CENTRUM SILVER ULTRA WOMEN'S Tab tablet Generic drug:  multivit-mineral-iron-lutein Take 1 Tab by mouth daily. hydroCHLOROthiazide 25 mg tablet Commonly known as:  HYDRODIURIL Take 1 Tab by mouth daily. Indications: pressure We Performed the Following ADMIN PNEUMOCOCCAL VACCINE [ [facility]] [address] [[id number] [facility]] PNEUMOCOCCAL POLYSACCHARIDE VACCINE, 23-VALENT, ADULT OR IMMUNOSUPPRESSED PT DOSE, [54619 CPT(R)] SC ANNUAL ALCOHOL SCREEN 15 MIN [id number] [facility]] Follow-up Instructions Return in about 3 months (around 1/23/2018). Patient Instructions Medicare Wellness Visit, Female The best way to live healthy is to have a healthy lifestyle by eating a well-balanced diet, exercising regularly, limiting alcohol and stopping smoking. Regular physical exams and screening tests are another way to keep healthy. Preventive exams provided by your health care provider can find health problems before they become diseases or illnesses. Preventive services including immunizations, screening tests, monitoring and exams can help you take care of your own health. All people over age 72 should have a pneumovax  and and a prevnar shot to prevent pneumonia. These are once in a lifetime unless you and your provider decide differently. All people over 65 should have a yearly flu shot and a tetanus vaccine every 10 years. A bone mass density to screen for osteoporosis or thinning of the bones should be done every 2 years after 65. Screening for diabetes mellitus with a blood sugar test should be done every year. Glaucoma is a disease of the eye due to increased ocular pressure that can lead to blindness and it should be done every year by an eye professional. 
 
Cardiovascular screening tests that check for elevated lipids (fatty part of blood) which can lead to heart disease and strokes should be done every 5 years. Colorectal screening that evaluates for blood or polyps in your colon should be done yearly as a stool test or every five years as a flexible sigmoidoscope or every 10 years as a colonoscopy up to age 76. Breast cancer screening with a mammogram is recommended biennially  for women age 54-69. Screening for cervical cancer with a pap smear and pelvic exam is recommended for women after age 72 years every 2 years up to age 79 or when the provider and patient decide to stop. If there is a history of cervical abnormalities or other increased risk for cancer then the test is recommended yearly. Hepatitis C screening is also recommended for anyone born between 80 through [address]. A shingles vaccine is also recommended once in a lifetime after age 61. Your Medicare Wellness Exam is recommended annually. Here is a list of your current Health Maintenance items with a due date: 
Health Maintenance Due Topic Date Due  Shingles Vaccine  04/27/1995  Pneumococcal Vaccine (2 of 2 - PPSV23) 10/13/2017 Chavez Deras Annual Well Visit  10/13/2017  Breast Cancer Screening  11/11/2017 If you have any questions regarding [employer], you may call [employer] support at [phone]. Patient Instructions History Introducing [facility] & HEALTH SERVICES! [employer] introduces [employer] patient portal. Now you can access parts of your medical record, email your doctor's office, and request medication refills online. 1. In your internet browser, go to [email or web address] Step/[employer] 2. Click on the First Time User? Click Here link in the Sign In box. You will see the New Member Sign Up page. 3. Enter your [employer] Access Code exactly as it appears below. You will not need to use this code after youve completed the sign-up process. If you do not sign up before the expiration date, you must request a new code. · [employer] Access Code: [email or web address] Expires: 1/21/2018  2:40 PM 
 
4. Enter the last four digits of your Social Security Number (xxxx) and Date of Birth (mm/dd/yyyy) as indicated and click Submit. You will be taken to the next sign-up page. 5. Create a [employer] ID. This will be your [facility] login ID and cannot be changed, so think of one that is secure and easy to remember. 6. Create a [facility] password. You can change your password at any time. 7. Enter your Password Reset Question and Answer. This can be used at a later time if you forget your password. 8. Enter your e-mail address. You will receive e-mail notification when new information is available in [address] 9. Click Sign Up. You can now view and download portions of your medical record. 10. Click the Download Summary menu link to download a portable copy of your medical information. If you have questions, please visit the Frequently Asked Questions section of the [place] [place] website. Remember, [facility] is NOT to be used for urgent needs. For medical emergencies, dial 911. Now available from your iPhone and Android! Please provide this summary of care documentation to your next provider. Your primary care clinician is listed as Liam Rouse. If you have any questions after today's visit, please call [phone].

## 2017-10-23 NOTE — PROGRESS NOTES
Niraj Gonzalez is a 80 y.o. female and presents for annual Medicare Wellness Visit. Problem List: Reviewed with patient and discussed risk factors. 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    GERD (gastroesophageal reflux disease) K21.9    Carotid stenosis I65.29    Hyperlipidemia E78.5    ASCVD (arteriosclerotic cardiovascular disease) I25.10    HTN (hypertension) I10    BPPV (benign paroxysmal positional vertigo) H81.10    History of myocardial infarct at age greater than 61 years I25.2       Current medical providers:  Patient Care Team:  Agustina Padilla MD as PCP - General (Family Practice)    PSH: Reviewed with patient  Past Surgical History:   Procedure Laterality Date    HX COLONOSCOPY      approx 4 years ago, in [address]        SH: Reviewed with patient  Social History   Substance Use Topics    Smoking status: Former Smoker     Quit date: 10/12/1976    Smokeless tobacco: Never Used    Alcohol use No       FH: Reviewed with patient  Family History   Problem Relation Age of Onset    Heart Disease Mother     Stroke Father        Medications/Allergies: Reviewed with patient  Current Outpatient Prescriptions on File Prior to Visit   Medication Sig Dispense Refill    atorvastatin (LIPITOR) 40 mg tablet TAKE 1 TABLET EVERY DAY FOR CHOLESTEROL, HEART 90 Tab 3    aspirin delayed-release 81 mg tablet Take 1 Tab by mouth daily. 100 Tab 3    hydroCHLOROthiazide (HYDRODIURIL) 25 mg tablet Take 1 Tab by mouth daily. Indications: pressure 90 Tab 3    amLODIPine (NORVASC) 5 mg tablet Take 1 Tab by mouth daily. Indications: pressure and heart 90 Tab 3    multivit-mineral-iron-lutein (CENTRUM SILVER ULTRA WOMEN'S) tab tablet Take 1 Tab by mouth daily.  atenolol (TENORMIN) 25 mg tablet Take 1 Tab by mouth daily.  Indications: heart 90 Tab 3     No current facility-administered medications on file prior to visit. Allergies   Allergen Reactions    Olanzapine Unknown (comments)    Statins-Hmg-Coa Reductase Inhibitors Unable to Obtain     Patient can tolerate atorvastatin  Unknown which one, tolerates Atorvastatin    Synthroid [Levothyroxine] Rash       Objective:  Visit Vitals    /54    Pulse 66    Temp 98.4 °F (36.9 °C) (Temporal)    Ht 5' (1.524 m)    Wt 126 lb (57.2 kg)    SpO2 100%    BMI 24.61 kg/m2    Body mass index is 24.61 kg/(m^2). Assessment of cognitive impairment: Alert and oriented x 3    Depression Screen:   PHQ over the last two weeks 10/23/2017   PHQ Not Done -   Little interest or pleasure in doing things Not at all   Feeling down, depressed or hopeless Not at all   Total Score PHQ 2 0       Fall Risk Assessment:    Fall Risk Assessment, last 12 mths 10/23/2017   Able to walk? Yes   Fall in past 12 months? No       Functional Ability:   Does the patient exhibit a steady gait? yes   How long did it take the patient to get up and walk from a sitting position? 1     Is the patient self reliant?  (ie can do own laundry, meals, household chores)  yes     Does the patient handle his/her own medications? yes     Does the patient handle his/her own money? yes     Is the patients home safe (ie good lighting, handrails on stairs and bath, etc.)? yes     Did you notice or did patient express any hearing difficulties? yes     Did you notice or did patient express any vision difficulties?   no     Were distance and reading eye charts used? no       Advance Care Planning:   Patient was offered the opportunity to discuss advance care planning:  yes     Does patient have an Advance Directive:  yes   If no, did you provide information on [employer]? Yes         Plan:      HTN  well controlled. con't current tx, but on atenolol, which is under a shortage condition. If runs out and can't get new bottle, plan change to toprol XL 25mg every day.     PT plans to get mammo with [facility] in next mo or so. Orders Placed This Encounter    Depression Screen Annual    Pneumococcal Polysaccharide Vaccine    Pneumococcal Admin ()    Annual  Alcohol Screen 15 min ()       Health Maintenance   Topic Date Due    ZOSTER VACCINE AGE 60>  04/27/1995    Pneumococcal 65+ Low/Medium Risk (2 of 2 - PPSV23) 10/13/2017    MEDICARE YEARLY EXAM  10/13/2017    BREAST CANCER SCRN MAMMOGRAM  11/11/2017    GLAUCOMA SCREENING Q2Y  08/14/2019    DTaP/Tdap/Td series (2 - Td) 08/14/2027    OSTEOPOROSIS SCREENING (DEXA)  Completed    INFLUENZA AGE 9 TO ADULT  Addressed       *Patient verbalized understanding and agreement with the plan. A copy of the After Visit Summary with personalized health plan was given to the patient today.

## 2017-10-23 NOTE — PATIENT INSTRUCTIONS

## 2018-02-19 ENCOUNTER — OFFICE VISIT (OUTPATIENT)
Dept: FAMILY MEDICINE CLINIC | Age: 83
End: 2018-02-19

## 2018-02-19 VITALS
OXYGEN SATURATION: 97 % | RESPIRATION RATE: 14 BRPM | TEMPERATURE: 98.4 F | DIASTOLIC BLOOD PRESSURE: 58 MMHG | SYSTOLIC BLOOD PRESSURE: 122 MMHG | HEART RATE: 64 BPM | WEIGHT: 130 LBS | BODY MASS INDEX: 25.52 KG/M2 | HEIGHT: 60 IN

## 2018-02-19 DIAGNOSIS — E78.00 PURE HYPERCHOLESTEROLEMIA: ICD-10-CM

## 2018-02-19 DIAGNOSIS — I25.10 ASCVD (ARTERIOSCLEROTIC CARDIOVASCULAR DISEASE): ICD-10-CM

## 2018-02-19 DIAGNOSIS — I10 ESSENTIAL HYPERTENSION: Primary | ICD-10-CM

## 2018-02-19 DIAGNOSIS — Z23 ENCOUNTER FOR IMMUNIZATION: ICD-10-CM

## 2018-02-19 NOTE — PATIENT INSTRUCTIONS
If you have any questions regarding [employer], you may call [employer] support at [phone].

## 2018-02-19 NOTE — MR AVS SNAPSHOT
[address] [facility] [phone] Patient: Qamar Galindo MRN: [id number] :1935 Visit Information Date & Time Provider Department Dept. Phone Encounter #  
 2018  8:50 AM Avery Brittle, MD [address] [id number] Follow-up Instructions Return in about 6 months (around 2018). Upcoming Health Maintenance Date Due  
 MEDICARE YEARLY EXAM 10/24/2018 GLAUCOMA SCREENING Q2Y 2019 DTaP/Tdap/Td series (2 - Td) 2027 Allergies as of 2018  Review Complete On: 2018 By: Avery Brittle, MD  
  
 Severity Noted Reaction Type Reactions Olanzapine  2010    Unknown (comments) Statins-hmg-coa Reductase Inhibitors  2014    Unable to Obtain Patient can tolerate atorvastatin Unknown which one, tolerates Atorvastatin Synthroid [Levothyroxine]  2016    Rash Current Immunizations  Reviewed on 10/23/2017 Name Date Influenza Vaccine 2016, 10/15/2015, 2014, 10/21/2013 Pneumococcal Conjugate (PCV-13) 10/13/2016 Pneumococcal Polysaccharide (PPSV-23) 10/23/2017 Tetanus Toxoid, Adsorbed 2015 Not reviewed this visit You Were Diagnosed With   
  
 Codes Comments Essential hypertension    -  Primary ICD-10-CM: I10 
ICD-9-CM: 401.9 Pure hypercholesterolemia     ICD-10-CM: E78.00 ICD-9-CM: 272.0 ASCVD (arteriosclerotic cardiovascular disease)     ICD-10-CM: I25.10 ICD-9-CM: 429.2, 440.9 Encounter for immunization     ICD-10-CM: Y73 ICD-9-CM: V03.89 Vitals BP Pulse Temp Resp Height(growth percentile) Weight(growth percentile) 122/58 (BP 1 Location: Left arm, BP Patient Position: Sitting) 64 98.4 °F (36.9 °C) (Oral) 14 5' (1.524 m) 130 lb (59 kg) SpO2 BMI OB Status Smoking Status 97% 25.39 kg/m2 Postmenopausal Former Smoker Vitals History BMI and BSA Data Body Mass Index Body Surface Area  
 25.39 kg/m 2 1.58 m 2 Preferred Pharmacy Pharmacy Name Phone [facility] [address] [place] [address] - [address] [address] [phone] Your Updated Medication List  
  
   
This list is accurate as of: 18  9:26 AM.  Always use your most recent med list. amLODIPine 5 mg tablet Commonly known as:  Milwaukee Sandhoff Take 1 Tab by mouth daily. Indications: pressure and heart  
  
 aspirin delayed-release 81 mg tablet Take 1 Tab by mouth daily. atenolol 25 mg tablet Commonly known as:  TENORMIN Take 1 Tab by mouth daily. Indications: heart  
  
 atorvastatin 40 mg tablet Commonly known as:  LIPITOR  
TAKE 1 TABLET EVERY DAY FOR CHOLESTEROL, HEART  
  
 CENTRUM SILVER ULTRA WOMEN'S Tab tablet Generic drug:  multivit-mineral-iron-lutein Take 1 Tab by mouth daily. hydroCHLOROthiazide 25 mg tablet Commonly known as:  HYDRODIURIL Take 1 Tab by mouth daily. Indications: pressure  
  
 varicella-zoster recombinant (PF) 50 mcg/0.5 mL Susr injection Commonly known as:  SHINGRIX  
0.5 mL by IntraMUSCular route once for 1 dose. Indications: prevent shingles Prescriptions Printed Refills  
 varicella-zoster recombinant, PF, (SHINGRIX) 50 mcg/0.5 mL susr injection 0 Si.5 mL by IntraMUSCular route once for 1 dose. Indications: prevent shingles Class: Print Route: IntraMUSCular We Performed the Following LIPID PANEL [12108 CPT(R)] METABOLIC PANEL, COMPREHENSIVE [11664 CPT(R)] Follow-up Instructions Return in about 6 months (around 2018). Patient Instructions If you have any questions regarding [employer], you may call [employer] support at [phone]. Introducing [facility] & HEALTH SERVICES!    
 Sosa Reed introduces [employer] patient portal. Now you can access parts of your medical record, email your doctor's office, and request medication refills online. 1. In your internet browser, go to [email or web address] 2. Click on the First Time User? Click Here link in the Sign In box. You will see the New Member Sign Up page. 3. Enter your [employer] Access Code exactly as it appears below. You will not need to use this code after youve completed the sign-up process. If you do not sign up before the expiration date, you must request a new code. · [employer] Access Code: [email or web address] Expires: 5/20/2018  9:26 AM 
 
4. Enter the last four digits of your Social Security Number (xxxx) and Date of Birth (mm/dd/yyyy) as indicated and click Submit. You will be taken to the next sign-up page. 5. Create a [employer]ia[employer] ID. This will be your [employer] login ID and cannot be changed, so think of one that is secure and easy to remember. 6. Create a [employer] password. You can change your password at any time. 7. Enter your Password Reset Question and Answer. This can be used at a later time if you forget your password. 8. Enter your e-mail address. You will receive e-mail notification when new information is available in [address] 9. Click Sign Up. You can now view and download portions of your medical record. 10. Click the Download Summary menu link to download a portable copy of your medical information. If you have questions, please visit the Frequently Asked Questions section of the [employer] website. Remember, [employer] is NOT to be used for urgent needs. For medical emergencies, dial 911. Now available from your iPhone and Android! Please provide this summary of care documentation to your next provider. Your primary care clinician is listed as Oseas Rouse. If you have any questions after today's visit, please call [phone].

## 2018-02-19 NOTE — PROGRESS NOTES
Godwin Ba is a 80 y.o. female presenting for/with:    Hypertension (fu)    HPI:  Hypertension. Blood pressures have been in goal. Management at last visit included con't cu rrent tx. No further problems with that sx. Current regimen: beta-blocker, thiazide, and calcium channel blocker. Symptoms include no sx. Patient denies chest pain, palpitations, peripheral edema. Lab review:     Results for orders placed or performed in visit on 78/00/45   METABOLIC PANEL, BASIC   Result Value Ref Range    Glucose 95 65 - 99 mg/dL    BUN 18 8 - 27 mg/dL    Creatinine 1.53 (H) 0.57 - 1.00 mg/dL    GFR est non-AA 32 (L) >59 mL/min/1.73    GFR est AA 37 (L) >59 mL/min/1.73    BUN/Creatinine ratio 12 11 - 26    Sodium 143 134 - 144 mmol/L    Potassium 3.6 3.5 - 5.2 mmol/L    Chloride 101 96 - 106 mmol/L    CO2 25 18 - 29 mmol/L    Calcium 9.9 8.7 - 10.3 mg/dL   MICROALBUMIN, UR, RAND W/ MICROALBUMIN/CREA RATIO   Result Value Ref Range    Creatinine, urine 173.8 Not Estab. mg/dL    Microalbumin, urine 22.4 Not Estab. ug/mL    Microalb/Creat ratio (ug/mg creat.) 12.9 0.0 - 30.0 mg/g creat     Hyperlipidemia and ASCVD. On lipitor 40. Taran well. No myalgias, arthralgias, unusual weakness. Lab Results   Component Value Date/Time    Cholesterol, total 226 (H) 08/14/2017 09:45 AM    HDL Cholesterol 69 08/14/2017 09:45 AM    LDL, calculated 127 (H) 08/14/2017 09:45 AM    VLDL, calculated 30 08/14/2017 09:45 AM    Triglyceride 151 (H) 08/14/2017 09:45 AM     Lab Results   Component Value Date/Time    ALT (SGPT) 16 08/14/2017 09:45 AM    AST (SGOT) 24 08/14/2017 09:45 AM    Alk. phosphatase 98 08/14/2017 09:45 AM    Bilirubin, total 0.3 08/14/2017 09:45 AM     PMH, SH, Medications/Allergies: reviewed, on chart.     ROS:  Constitutional: No fever, chills or weight loss  Respiratory: No cough, SOB   CV: No chest pain or Palpitations    Visit Vitals    /58 (BP 1 Location: Left arm, BP Patient Position: Sitting)    Pulse 64    Temp 98.4 °F (36.9 °C) (Oral)    Resp 14    Ht 5' (1.524 m)    Wt 130 lb (59 kg)    SpO2 97%    BMI 25.39 kg/m2     Wt Readings from Last 3 Encounters:   02/19/18 130 lb (59 kg)   10/23/17 126 lb (57.2 kg)   08/14/17 126 lb (57.2 kg)     BP Readings from Last 3 Encounters:   02/19/18 122/58   10/23/17 143/54   08/14/17 133/55     Physical Examination: General appearance - alert, well appearing, and in no distress  Mental status - alert, oriented to person, place, and time  Eyes - pupils equal and reactive, extraocular eye movements intact  ENT - bilateral external ears and nose normal. Normal lips  Neck - supple, no significant adenopathy, no thyromegaly or mass  Lymphatics - no palpable lymphadenopathy, no hepatosplenomegaly  Chest - clear to auscultation, no wheezes, rales or rhonchi, symmetric air entry  Heart - normal rate, regular rhythm, normal S1, S2, no murmurs, rubs, clicks or gallops  Extremities - peripheral pulses normal, no pedal edema, no clubbing or cyanosis    A/p:  HTN  Good control. No sx. Con't current tx. recheck CMP.    HLD/ASCVD  well controlled. con't current tx. Recheck lipids, if not in goal again, plan boost to lipitor 80. HCM  Shots UTD except for Shingrix. Pt will check on pricing. Print given. Pt rec to discuss with marleny. Mammo due for discussion. Pt declines. Probably ok to be done at age 80.     F/U 6mo/PRN

## 2018-05-07 DIAGNOSIS — I25.10 CORONARY ARTERY DISEASE INVOLVING NATIVE CORONARY ARTERY OF NATIVE HEART WITHOUT ANGINA PECTORIS: ICD-10-CM

## 2018-05-07 DIAGNOSIS — I10 BENIGN ESSENTIAL HTN: ICD-10-CM

## 2018-05-07 DIAGNOSIS — I10 ESSENTIAL HYPERTENSION: ICD-10-CM

## 2018-05-08 RX ORDER — AMLODIPINE BESYLATE 5 MG/1
5 TABLET ORAL DAILY
Qty: 90 TAB | Refills: 3 | Status: SHIPPED | OUTPATIENT
Start: 2018-05-08 | End: 2018-05-10 | Stop reason: SDUPTHER

## 2018-05-08 RX ORDER — ATENOLOL 25 MG/1
25 TABLET ORAL DAILY
Qty: 90 TAB | Refills: 3 | Status: SHIPPED | OUTPATIENT
Start: 2018-05-08 | End: 2018-05-10 | Stop reason: SDUPTHER

## 2018-05-10 DIAGNOSIS — I10 ESSENTIAL HYPERTENSION: ICD-10-CM

## 2018-05-10 DIAGNOSIS — I25.10 CORONARY ARTERY DISEASE INVOLVING NATIVE CORONARY ARTERY OF NATIVE HEART WITHOUT ANGINA PECTORIS: ICD-10-CM

## 2018-05-10 DIAGNOSIS — I10 BENIGN ESSENTIAL HTN: ICD-10-CM

## 2018-05-11 RX ORDER — ATENOLOL 25 MG/1
25 TABLET ORAL DAILY
Qty: 90 TAB | Refills: 3 | Status: SHIPPED | OUTPATIENT
Start: 2018-05-11 | End: 2018-05-15 | Stop reason: SDUPTHER

## 2018-05-11 RX ORDER — AMLODIPINE BESYLATE 5 MG/1
5 TABLET ORAL DAILY
Qty: 90 TAB | Refills: 3 | Status: SHIPPED | OUTPATIENT
Start: 2018-05-11 | End: 2018-05-15 | Stop reason: SDUPTHER

## 2018-05-15 DIAGNOSIS — I10 BENIGN ESSENTIAL HTN: ICD-10-CM

## 2018-05-15 DIAGNOSIS — I10 ESSENTIAL HYPERTENSION: ICD-10-CM

## 2018-05-15 DIAGNOSIS — I25.10 CORONARY ARTERY DISEASE INVOLVING NATIVE CORONARY ARTERY OF NATIVE HEART WITHOUT ANGINA PECTORIS: ICD-10-CM

## 2018-05-15 RX ORDER — ATENOLOL 25 MG/1
25 TABLET ORAL DAILY
Qty: 90 TAB | Refills: 3 | Status: SHIPPED | OUTPATIENT
Start: 2018-05-15 | End: 2018-08-20 | Stop reason: ALTCHOICE

## 2018-05-15 RX ORDER — AMLODIPINE BESYLATE 5 MG/1
5 TABLET ORAL DAILY
Qty: 90 TAB | Refills: 3 | Status: SHIPPED | OUTPATIENT
Start: 2018-05-15 | End: 2019-05-30 | Stop reason: SDUPTHER

## 2018-05-29 ENCOUNTER — CLINICAL SUPPORT (OUTPATIENT)
Dept: FAMILY MEDICINE CLINIC | Age: 83
End: 2018-05-29

## 2018-05-29 DIAGNOSIS — H61.21 EXCESSIVE EAR WAX, RIGHT: Primary | ICD-10-CM

## 2018-05-29 NOTE — PATIENT INSTRUCTIONS
If you have any questions regarding [employer], you may call [employer] support at [phone].

## 2018-05-29 NOTE — MR AVS SNAPSHOT
Violetta Bush 
 
 
 [address] [address] [phone] Patient: Leann Fonseca MRN: [id number] :1935 Visit Information Date & Time Provider Department Dept. Phone Encounter #  
 2018  2:00 PM Clinton Simpson [id number] Your Appointments 2018 10:10 AM  
ESTABLISHED PATIENT with Stefani Tavarez MD  
[place] [address] ([address] [place] [address]) Appt Note: 6 mo f/u  
 [address] [address] [phone]  
  
   
 [address] [address] Upcoming Health Maintenance Date Due Influenza Age 5 to Adult 2018 MEDICARE YEARLY EXAM 10/24/2018 GLAUCOMA SCREENING Q2Y 2019 DTaP/Tdap/Td series (2 - Td) 2027 Allergies as of 2018  Review Complete On: 2018 By: Stefani Tavarez MD  
  
 Severity Noted Reaction Type Reactions Olanzapine  2010    Unknown (comments) Statins-hmg-coa Reductase Inhibitors  2014    Unable to Obtain Patient can tolerate atorvastatin Unknown which one, tolerates Atorvastatin Synthroid [Levothyroxine]  2016    Rash Current Immunizations  Reviewed on 10/23/2017 Name Date Influenza Vaccine 2016, 10/15/2015, 2014, 10/21/2013 Pneumococcal Conjugate (PCV-13) 10/13/2016 Pneumococcal Polysaccharide (PPSV-23) 10/23/2017 Tetanus Toxoid, Adsorbed 2015 Not reviewed this visit You Were Diagnosed With   
  
 Codes Comments Excessive ear wax, right    -  Primary ICD-10-CM: H61.21 ICD-9-CM: 380.4 Vitals OB Status Smoking Status Postmenopausal Former Smoker Preferred Pharmacy Pharmacy Name Phone [address] [place] [address] [address] [facility] [address] [phone] Your Updated Medication List  
  
   
 This list is accurate as of 5/29/18  2:04 PM.  Always use your most recent med list. amLODIPine 5 mg tablet Commonly known as:  Maverick Belen Take 1 Tab by mouth daily. Indications: pressure and heart  
  
 aspirin delayed-release 81 mg tablet Take 1 Tab by mouth daily. atenolol 25 mg tablet Commonly known as:  TENORMIN Take 1 Tab by mouth daily. Indications: heart  
  
 atorvastatin 40 mg tablet Commonly known as:  LIPITOR  
TAKE 1 TABLET EVERY DAY FOR CHOLESTEROL, HEART  
  
 CENTRUM SILVER ULTRA WOMEN'S Tab tablet Generic drug:  multivit-mineral-iron-lutein Take 1 Tab by mouth daily. hydroCHLOROthiazide 25 mg tablet Commonly known as:  HYDRODIURIL Take 1 Tab by mouth daily. Indications: pressure Patient Instructions If you have any questions regarding [employer], you may call [employer] Pages support at [phone]. Introducing [facility] & HEALTH SERVICES! Asya Barnes introduces [employer] patient portal. Now you can access parts of your medical record, email your doctor's office, and request medication refills online. 1. In your internet browser, go to [email or web address]://[email or web address] [email or web address]/[email or web address] 2. Click on the First Time User? Click Here link in the Sign In box. You will see the New Member Sign Up page. 3. Enter your [employer] Access Code exactly as it appears below. You will not need to use this code after youve completed the sign-up process. If you do not sign up before the expiration date, you must request a new code. · [employer] Access Code: [email or web address] Expires: 8/27/2018  2:04 PM 
 
4. Enter the last four digits of your Social Security Number (xxxx) and Date of Birth (mm/dd/yyyy) as indicated and click Submit. You will be taken to the next sign-up page. 5. Create a [employer] ID. This will be your [employer] login ID and cannot be changed, so think of one that is secure and easy to remember. 6. Create a [employer] password. You can change your password at any time. 7. Enter your Password Reset Question and Answer. This can be used at a later time if you forget your password. 8. Enter your e-mail address. You will receive e-mail notification when new information is available in [address] 9. Click Sign Up. You can now view and download portions of your medical record. 10. Click the Download Summary menu link to download a portable copy of your medical information. If you have questions, please visit the Frequently Asked Questions section of the [employer] website. Remember, [employer] is NOT to be used for urgent needs. For medical emergencies, dial 911. Now available from your iPhone and Android! Please provide this summary of care documentation to your next provider. Your primary care clinician is listed as Layla Rouse. If you have any questions after today's visit, please call [phone].

## 2018-08-20 ENCOUNTER — OFFICE VISIT (OUTPATIENT)
Dept: FAMILY MEDICINE CLINIC | Age: 83
End: 2018-08-20

## 2018-08-20 VITALS
SYSTOLIC BLOOD PRESSURE: 140 MMHG | HEIGHT: 60 IN | BODY MASS INDEX: 25.95 KG/M2 | DIASTOLIC BLOOD PRESSURE: 60 MMHG | TEMPERATURE: 98 F | HEART RATE: 58 BPM | WEIGHT: 132.2 LBS | OXYGEN SATURATION: 98 %

## 2018-08-20 DIAGNOSIS — I10 ESSENTIAL HYPERTENSION: ICD-10-CM

## 2018-08-20 DIAGNOSIS — E78.00 PURE HYPERCHOLESTEROLEMIA: ICD-10-CM

## 2018-08-20 DIAGNOSIS — I25.10 CORONARY ARTERY DISEASE INVOLVING NATIVE CORONARY ARTERY OF NATIVE HEART WITHOUT ANGINA PECTORIS: ICD-10-CM

## 2018-08-20 DIAGNOSIS — I25.2 HISTORY OF MYOCARDIAL INFARCT AT AGE GREATER THAN 60 YEARS: ICD-10-CM

## 2018-08-20 DIAGNOSIS — I25.10 ASCVD (ARTERIOSCLEROTIC CARDIOVASCULAR DISEASE): Primary | ICD-10-CM

## 2018-08-20 RX ORDER — METOPROLOL SUCCINATE 25 MG/1
25 TABLET, EXTENDED RELEASE ORAL DAILY
Qty: 90 TAB | Refills: 3 | Status: SHIPPED | OUTPATIENT
Start: 2018-08-20 | End: 2018-12-17 | Stop reason: SDUPTHER

## 2018-08-20 NOTE — PROGRESS NOTES
1. Have you been to the ER, urgent care clinic since your last visit? Hospitalized since your last visit? No    2. Have you seen or consulted any other health care providers outside of the [address] [place] [address] since your last visit? Include any pap smears or colon screening. Yelena Mcdonald is a 80 y.o. female presenting for/with:    Hypertension (f/u)    HPI:  Hypertension. Blood pressures have been up a little. Management at last visit included con't current tx. Current regimen: beta-blocker, thiazide, and calcium channel blocker. Symptoms include no sx. Patient denies chest pain, palpitations, peripheral edema. Lab review:     Results for orders placed or performed in visit on 28/92/07   METABOLIC PANEL, BASIC   Result Value Ref Range    Glucose 95 65 - 99 mg/dL    BUN 18 8 - 27 mg/dL    Creatinine 1.53 (H) 0.57 - 1.00 mg/dL    GFR est non-AA 32 (L) >59 mL/min/1.73    GFR est AA 37 (L) >59 mL/min/1.73    BUN/Creatinine ratio 12 11 - 26    Sodium 143 134 - 144 mmol/L    Potassium 3.6 3.5 - 5.2 mmol/L    Chloride 101 96 - 106 mmol/L    CO2 25 18 - 29 mmol/L    Calcium 9.9 8.7 - 10.3 mg/dL   MICROALBUMIN, UR, RAND W/ MICROALBUMIN/CREA RATIO   Result Value Ref Range    Creatinine, urine 173.8 Not Estab. mg/dL    Microalbumin, urine 22.4 Not Estab. ug/mL    Microalb/Creat ratio (ug/mg creat.) 12.9 0.0 - 30.0 mg/g creat     Hyperlipidemia and ASCVD. On lipitor 40. Taran well. No myalgias, arthralgias, unusual weakness. Lab Results   Component Value Date/Time    Cholesterol, total 226 (H) 08/14/2017 09:45 AM    HDL Cholesterol 69 08/14/2017 09:45 AM    LDL, calculated 127 (H) 08/14/2017 09:45 AM    VLDL, calculated 30 08/14/2017 09:45 AM    Triglyceride 151 (H) 08/14/2017 09:45 AM     Lab Results   Component Value Date/Time    ALT (SGPT) 16 08/14/2017 09:45 AM    AST (SGOT) 24 08/14/2017 09:45 AM    Alk.  phosphatase 98 08/14/2017 09:45 AM    Bilirubin, total 0.3 08/14/2017 09:45 AM     PMH, SH, Medications/Allergies: reviewed, on chart. ROS:  Constitutional: No fever, chills or weight loss  Respiratory: No cough, SOB   CV: No chest pain or Palpitations    Visit Vitals    /60 (BP 1 Location: Left arm, BP Patient Position: Sitting)    Pulse (!) 58    Temp 98 °F (36.7 °C) (Oral)    Ht 5' (1.524 m)    Wt 132 lb 3.2 oz (60 kg)    SpO2 98%    BMI 25.82 kg/m2     Wt Readings from Last 3 Encounters:   08/20/18 132 lb 3.2 oz (60 kg)   02/19/18 130 lb (59 kg)   10/23/17 126 lb (57.2 kg)     BP Readings from Last 3 Encounters:   08/20/18 140/60   02/19/18 122/58   10/23/17 143/54     Physical Examination: General appearance - alert, well appearing, and in no distress  Mental status - alert, oriented to person, place, and time  Eyes - pupils equal and reactive, extraocular eye movements intact  ENT - bilateral external ears and nose normal. Normal lips  Neck - supple, no significant adenopathy, no thyromegaly or mass  Lymphatics - no palpable lymphadenopathy, no hepatosplenomegaly  Chest - clear to auscultation, no wheezes, rales or rhonchi, symmetric air entry  Heart - normal rate, regular rhythm, normal S1, S2, no murmurs, rubs, clicks or gallops  Extremities - peripheral pulses normal, no pedal edema, no clubbing or cyanosis    A/p:  HTN  Fair control by new guidelines for ASCVD pt. Recheck CMP. Change atenolol to toprol XL 25mg daily, should give better control of BP. HLD/ASCVD  well controlled. con't current tx. Recheck lipids, if not in goal again, plan boost to lipitor 80. HCM  Shots UTD except for Shingrix. Pt checked on pricing, too expensive. Pt rec to check again this fall. Mammo- pt declines further screening, has low risk.     F/U 3mo/PRN

## 2018-08-20 NOTE — PATIENT INSTRUCTIONS
We're changing your atenolol to metoprolol ER, it's a newer and better pressure/heart pill. Stop the atenolol once you get the new metoprolol in the mail, the amlodipine and hydrochlorothiazide stay the same.

## 2018-08-20 NOTE — MR AVS SNAPSHOT
[address] [facility] [phone] Patient: Manasa Cloud MRN: [id number] :1935 Visit Information Date & Time Provider Department Dept. Phone Encounter #  
 2018 10:10 AM Ivy Chang MD [address] [place] [address] [id number] Follow-up Instructions Return in about 3 months (around 2018). Follow-up and Disposition History Upcoming Health Maintenance Date Due Influenza Age 5 to Adult 2018* MEDICARE YEARLY EXAM 10/24/2018 GLAUCOMA SCREENING Q2Y 2019 DTaP/Tdap/Td series (2 - Td) 2027 *Topic was postponed. The date shown is not the original due date. Allergies as of 2018  Review Complete On: 2018 By: Ivy Chang MD  
  
 Severity Noted Reaction Type Reactions Olanzapine  2010    Unknown (comments) Statins-hmg-coa Reductase Inhibitors  2014    Unable to Obtain Patient can tolerate atorvastatin Unknown which one, tolerates Atorvastatin Synthroid [Levothyroxine]  2016    Rash Current Immunizations  Reviewed on 10/23/2017 Name Date Influenza Vaccine 2016, 10/15/2015, 2014, 10/21/2013 Pneumococcal Conjugate (PCV-13) 10/13/2016 Pneumococcal Polysaccharide (PPSV-23) 10/23/2017 Tetanus Toxoid, Adsorbed 2015 Not reviewed this visit You Were Diagnosed With   
  
 Codes Comments ASCVD (arteriosclerotic cardiovascular disease)    -  Primary ICD-10-CM: I25.10 ICD-9-CM: 429.2, 440.9 Essential hypertension     ICD-10-CM: I10 
ICD-9-CM: 401.9 Pure hypercholesterolemia     ICD-10-CM: E78.00 ICD-9-CM: 272.0 Vitals BP Pulse Temp Height(growth percentile) Weight(growth percentile) SpO2  
 140/60 (BP 1 Location: Left arm, BP Patient Position: Sitting) (!) 58 98 °F (36.7 °C) (Oral) 5' (1.524 m) 132 lb 3.2 oz (60 kg) 98% BMI OB Status Smoking Status 25.82 kg/m2 Postmenopausal Former Smoker BMI and BSA Data Body Mass Index Body Surface Area  
 25.82 kg/m 2 1.59 m 2 Preferred Pharmacy Pharmacy Name Phone [facility] Ko [place] - [address] [facility] [address] [phone] Your Updated Medication List  
  
   
This list is accurate as of 8/20/18 10:17 AM.  Always use your most recent med list. amLODIPine 5 mg tablet Commonly known as:  Lucas Alstrom Take 1 Tab by mouth daily. Indications: pressure and heart  
  
 aspirin delayed-release 81 mg tablet Take 1 Tab by mouth daily. atorvastatin 40 mg tablet Commonly known as:  LIPITOR  
TAKE 1 TABLET EVERY DAY FOR CHOLESTEROL, HEART  
  
 CENTRUM SILVER ULTRA WOMEN'S Tab tablet Generic drug:  multivit-mineral-iron-lutein Take 1 Tab by mouth daily. hydroCHLOROthiazide 25 mg tablet Commonly known as:  HYDRODIURIL Take 1 Tab by mouth daily. Indications: pressure  
  
 metoprolol succinate 25 mg XL tablet Commonly known as:  TOPROL-XL Take 1 Tab by mouth daily. Indications: pressure and heart, replaces atenolol Prescriptions Sent to Pharmacy Refills  
 metoprolol succinate (TOPROL-XL) 25 mg XL tablet 3 Sig: Take 1 Tab by mouth daily. Indications: pressure and heart, replaces atenolol Class: Normal  
 Pharmacy: [address] [place] [address], [address] [place] [address] #: [phone] Route: Oral  
  
We Performed the Following LIPID PANEL [97659 CPT(R)] METABOLIC PANEL, COMPREHENSIVE [45290 CPT(R)] Follow-up Instructions Return in about 3 months (around 11/20/2018). Patient Instructions We're changing your atenolol to metoprolol ER, it's a newer and better pressure/heart pill. Stop the atenolol once you get the new metoprolol in the mail, the amlodipine and hydrochlorothiazide stay the same. Introducing [facility]! [employer] introduces [employer] patient portal. Now you can access parts of your medical record, email your doctor's office, and request medication refills online. 1. In your internet browser, go to [email or web address] 2. Click on the First Time User? Click Here link in the Sign In box. You will see the New Member Sign Up page. 3. Enter your [employer] Access Code exactly as it appears below. You will not need to use this code after youve completed the sign-up process. If you do not sign up before the expiration date, you must request a new code. · [employer] Access Code: [email or web address] Expires: 8/27/2018  2:04 PM 
 
4. Enter the last four digits of your Social Security Number (xxxx) and Date of Birth (mm/dd/yyyy) as indicated and click Submit. You will be taken to the next sign-up page. 5. Create a [employer] ID. This will be your [employer] login ID and cannot be changed, so think of one that is secure and easy to remember. 6. Create a [employer] password. You can change your password at any time. 7. Enter your Password Reset Question and Answer. This can be used at a later time if you forget your password. 8. Enter your e-mail address. You will receive e-mail notification when new information is available in [address] 9. Click Sign Up. You can now view and download portions of your medical record. 10. Click the Download Summary menu link to download a portable copy of your medical information. If you have questions, please visit the Frequently Asked Questions section of the [employer] website. Remember, [employer] is NOT to be used for urgent needs. For medical emergencies, dial 911. Now available from your iPhone and Android! Please provide this summary of care documentation to your next provider. Your primary care clinician is listed as Velma Rouse. If you have any questions after today's visit, please call [phone].

## 2018-08-21 LAB
ALBUMIN SERPL-MCNC: 4.5 G/DL (ref 3.5–4.7)
ALBUMIN/GLOB SERPL: 1.6 {RATIO} (ref 1.2–2.2)
ALP SERPL-CCNC: 93 IU/L (ref 39–117)
ALT SERPL-CCNC: 20 IU/L (ref 0–32)
AST SERPL-CCNC: 27 IU/L (ref 0–40)
BILIRUB SERPL-MCNC: 0.3 MG/DL (ref 0–1.2)
BUN SERPL-MCNC: 31 MG/DL (ref 8–27)
BUN/CREAT SERPL: 20 (ref 12–28)
CALCIUM SERPL-MCNC: 10.4 MG/DL (ref 8.7–10.3)
CHLORIDE SERPL-SCNC: 99 MMOL/L (ref 96–106)
CHOLEST SERPL-MCNC: 194 MG/DL (ref 100–199)
CO2 SERPL-SCNC: 25 MMOL/L (ref 20–29)
CREAT SERPL-MCNC: 1.56 MG/DL (ref 0.57–1)
GLOBULIN SER CALC-MCNC: 2.8 G/DL (ref 1.5–4.5)
GLUCOSE SERPL-MCNC: 97 MG/DL (ref 65–99)
HDLC SERPL-MCNC: 62 MG/DL
LDLC SERPL CALC-MCNC: 113 MG/DL (ref 0–99)
POTASSIUM SERPL-SCNC: 3.5 MMOL/L (ref 3.5–5.2)
PROT SERPL-MCNC: 7.3 G/DL (ref 6–8.5)
SODIUM SERPL-SCNC: 143 MMOL/L (ref 134–144)
TRIGL SERPL-MCNC: 95 MG/DL (ref 0–149)
VLDLC SERPL CALC-MCNC: 19 MG/DL (ref 5–40)

## 2018-08-29 RX ORDER — ATORVASTATIN CALCIUM 80 MG/1
TABLET, FILM COATED ORAL
Qty: 90 TAB | Refills: 3 | Status: SHIPPED | OUTPATIENT
Start: 2018-08-29 | End: 2019-08-08 | Stop reason: SDUPTHER

## 2018-08-30 ENCOUNTER — TELEPHONE (OUTPATIENT)
Dept: FAMILY MEDICINE CLINIC | Age: 83
End: 2018-08-30

## 2020-01-06 PROBLEM — N18.30 CKD (CHRONIC KIDNEY DISEASE) STAGE 3, GFR 30-59 ML/MIN (HCC): Status: ACTIVE | Noted: 2020-01-06

## 2021-03-15 ENCOUNTER — TELEPHONE (OUTPATIENT)
Dept: FAMILY MEDICINE CLINIC | Age: 86
End: 2021-03-15

## 2021-03-15 NOTE — TELEPHONE ENCOUNTER
----- Message from [employer] sent at 3/15/2021  9:53 AM EDT -----  Regarding: Sandy Sebastian MD  General Message/Vendor Calls    Caller's first and last name: Maya Gunn Daughter        Reason for call Jignesh Tavarezjayneakilah Gutierrezjeana 35 required yes/no and why: Yes      Best contact number(s): [phone]      Details to clarify the request: caller would like to confirm if pt is able to received COVID vaccine at 03/23/2021 appt       [employer]

## 2021-03-15 NOTE — TELEPHONE ENCOUNTER
Call to patients daughter advised we don't have the vaccine in our office. I gave her other numbers to check to see if there is any availability.

## 2021-03-23 ENCOUNTER — OFFICE VISIT (OUTPATIENT)
Dept: FAMILY MEDICINE CLINIC | Age: 86
End: 2021-03-23
Payer: MEDICARE

## 2021-03-23 VITALS
TEMPERATURE: 98.1 F | OXYGEN SATURATION: 100 % | SYSTOLIC BLOOD PRESSURE: 142 MMHG | WEIGHT: 122 LBS | RESPIRATION RATE: 16 BRPM | DIASTOLIC BLOOD PRESSURE: 82 MMHG | HEIGHT: 60 IN | BODY MASS INDEX: 23.95 KG/M2 | HEART RATE: 94 BPM

## 2021-03-23 DIAGNOSIS — R19.5 LOOSE STOOLS: ICD-10-CM

## 2021-03-23 DIAGNOSIS — I10 ESSENTIAL HYPERTENSION: ICD-10-CM

## 2021-03-23 DIAGNOSIS — E78.00 PURE HYPERCHOLESTEROLEMIA: ICD-10-CM

## 2021-03-23 DIAGNOSIS — N18.30 STAGE 3 CHRONIC KIDNEY DISEASE, UNSPECIFIED WHETHER STAGE 3A OR 3B CKD (HCC): ICD-10-CM

## 2021-03-23 DIAGNOSIS — I25.10 ASCVD (ARTERIOSCLEROTIC CARDIOVASCULAR DISEASE): Primary | ICD-10-CM

## 2021-03-23 PROCEDURE — G8427 DOCREV CUR MEDS BY ELIG CLIN: HCPCS | Performed by: FAMILY MEDICINE

## 2021-03-23 PROCEDURE — 99214 OFFICE O/P EST MOD 30 MIN: CPT | Performed by: FAMILY MEDICINE

## 2021-03-23 PROCEDURE — G8420 CALC BMI NORM PARAMETERS: HCPCS | Performed by: FAMILY MEDICINE

## 2021-03-23 PROCEDURE — G8399 PT W/DXA RESULTS DOCUMENT: HCPCS | Performed by: FAMILY MEDICINE

## 2021-03-23 PROCEDURE — 1090F PRES/ABSN URINE INCON ASSESS: CPT | Performed by: FAMILY MEDICINE

## 2021-03-23 PROCEDURE — 1101F PT FALLS ASSESS-DOCD LE1/YR: CPT | Performed by: FAMILY MEDICINE

## 2021-03-23 PROCEDURE — G8510 SCR DEP NEG, NO PLAN REQD: HCPCS | Performed by: FAMILY MEDICINE

## 2021-03-23 PROCEDURE — G8753 SYS BP > OR = 140: HCPCS | Performed by: FAMILY MEDICINE

## 2021-03-23 PROCEDURE — G8754 DIAS BP LESS 90: HCPCS | Performed by: FAMILY MEDICINE

## 2021-03-23 PROCEDURE — G8536 NO DOC ELDER MAL SCRN: HCPCS | Performed by: FAMILY MEDICINE

## 2021-03-23 RX ORDER — CHOLESTYRAMINE 4 G/5G
1 POWDER, FOR SUSPENSION ORAL DAILY
Qty: 231 G | Refills: 11 | Status: SHIPPED | OUTPATIENT
Start: 2021-03-23 | End: 2021-09-14 | Stop reason: SDUPTHER

## 2021-03-23 RX ORDER — DORZOLAMIDE HYDROCHLORIDE AND TIMOLOL MALEATE 20; 5 MG/ML; MG/ML
1 SOLUTION/ DROPS OPHTHALMIC 2 TIMES DAILY
COMMUNITY
Start: 2021-02-01

## 2021-03-23 RX ORDER — VALSARTAN AND HYDROCHLOROTHIAZIDE 80; 12.5 MG/1; MG/1
1 TABLET, FILM COATED ORAL DAILY
Qty: 90 TAB | Refills: 3 | Status: SHIPPED | OUTPATIENT
Start: 2021-03-23 | End: 2021-09-15 | Stop reason: ALTCHOICE

## 2021-03-23 NOTE — PROGRESS NOTES
Chief Complaint   Patient presents with    Diarrhea     Pain Scale: /10  Pain Location:     1. Have you been to the ER, urgent care clinic since your last visit? Hospitalized since your last visit? No  2. Have you seen or consulted any other health care providers outside of the [address] [place] [address] since your last visit? Include any pap smears or colon screening. No    Bre Faith is a 80 y.o. female     HPI:  Hypertension. Blood pressures have been in goal, but a little higher today than before. Management at last visit included con't current tx. Current regimen: beta-blocker, calcium channel blocker and thiazide diuretic. Symptoms include no sx. Denies CP, palpitations, or syncope. Lab review:   Lab Results   Component Value Date/Time    Sodium 142 10/05/2020 09:56 AM    Potassium 3.5 10/05/2020 09:56 AM    Chloride 103 10/05/2020 09:56 AM    CO2 26 10/05/2020 09:56 AM    Anion gap 11 02/10/2020 10:07 AM    Glucose 98 10/05/2020 09:56 AM    BUN 28 (H) 10/05/2020 09:56 AM    Creatinine 1.67 (H) 10/05/2020 09:56 AM    BUN/Creatinine ratio 17 10/05/2020 09:56 AM    GFR est AA 32 (L) 10/05/2020 09:56 AM    GFR est non-AA 28 (L) 10/05/2020 09:56 AM    Calcium 9.8 10/05/2020 09:56 AM     Hyperlipidemia. On lipitor 80mg. Taran well. No myalgias, arthralgias, unusual weakness. Pretty much in goal on 11/2018 check. Lab Results   Component Value Date/Time    Cholesterol, total 189 07/06/2020 10:16 AM    HDL Cholesterol 59 07/06/2020 10:16 AM    LDL, calculated 109 (H) 07/06/2020 10:16 AM    VLDL, calculated 21 07/06/2020 10:16 AM    Triglyceride 105 07/06/2020 10:16 AM     Lab Results   Component Value Date/Time    ALT (SGPT) 14 07/06/2020 10:16 AM    Alk. phosphatase 94 07/06/2020 10:16 AM    Bilirubin, total 0.4 07/06/2020 10:16 AM     Loose stool  Better with cholestyramine. Comes and goes.  Well tolerated, but lately a whole packet has been binding her up too much, but if she misses a day, she gets watery stool again. PMH, SH, Medications/Allergies: reviewed, on chart. Current Outpatient Medications   Medication Sig    atorvastatin (LIPITOR) 40 mg tablet TAKE 1 TABLET EVERY DAY FOR CHOLESTEROL, HEART (NEW DOSE)    metoprolol succinate (TOPROL-XL) 50 mg XL tablet TAKE 1 TABLET EVERY DAY    cholestyramine (QUESTRAN) 4 gram packet Take 1 Packet by mouth daily as needed for Diarrhea.  hydroCHLOROthiazide (HYDRODIURIL) 25 mg tablet Take 1 Tab by mouth daily. Indications: pressure and heart    amLODIPine (NORVASC) 5 mg tablet TAKE 1 TABLET EVERY DAY  FOR  PRESSURE  AND  HEART    aspirin delayed-release 81 mg tablet Take 1 Tab by mouth daily.  multivit-mineral-iron-lutein (CENTRUM SILVER ULTRA WOMEN'S) tab tablet Take 1 Tab by mouth daily.  dorzolamide-timoloL (COSOPT) 22.3-6.8 mg/mL ophthalmic solution Administer 1 Drop to both eyes two (2) times a day. No current facility-administered medications for this visit.        ROS:  Constitutional: No fever, chills or weight loss  Respiratory: No cough, SOB   CV: No chest pain or Palpitations    Visit Vitals  BP (!) 142/82 (BP 1 Location: Right arm)   Pulse 94   Temp 98.1 °F (36.7 °C) (Oral)   Resp 16   Ht 5' (1.524 m)   Wt 122 lb (55.3 kg)   SpO2 100%   BMI 23.83 kg/m²     Wt Readings from Last 3 Encounters:   03/23/21 122 lb (55.3 kg)   10/05/20 123 lb 3.2 oz (55.9 kg)   07/06/20 126 lb 3.2 oz (57.2 kg)   -1#  BP Readings from Last 3 Encounters:   03/23/21 (!) 142/82   10/05/20 132/78   07/06/20 138/70     Physical Examination: General appearance - alert, well appearing, and in no distress  Mental status - alert, oriented to person, place, and time  Eyes - pupils equal and reactive, extraocular eye movements intact  ENT - bilateral external ears and nose normal. Normal lips  Neck - supple, no significant adenopathy, no thyromegaly or mass  Lymphatics - no palpable lymphadenopathy, no hepatosplenomegaly  Chest - clear to auscultation, no wheezes, rales or rhonchi, symmetric air entry  Heart - normal rate, irregular rhythm, normal S1, S2, no murmurs, rubs, clicks or gallops  Extremities - peripheral pulses normal, no pedal edema, no clubbing or cyanosis    A/p:  HTN and CKD3, a little above goal. Discussed options. Con't toprol-XL 50mg daily and norvasc 5mg/d., and change oretic 25mg to valsartan HCT 80/12.5mg/d  (avoid ACEI due to hx ACEI cough). Plan Check labs summer 2021. HLD and ASCVD  Doing well on lipitor 40mg, questran, and ASA 81mg/d. Plan recheck labs due summer 2021. Change in bowel habits  Doing well overall on questran, ok to adjust dose to half packet daily or other fraction to get stools firm but not too hard. F/U 3mo or per cardio. 1. Have you been to the ER, urgent care clinic since your last visit? Hospitalized since your last visit? No    2. Have you seen or consulted any other health care providers outside of the [address] [place] [address] since your last visit? Include any pap smears or colon screening. No    Identified pt with two pt identifiers(name and ). Reviewed record in preparation for visit and have obtained necessary documentation.     Symptom review:    NO  Fever   NO  Shaking chills  NO  Cough  NO Headaches  NO  Body aches  NO  Coughing up blood  NO  Chest congestion  NO  Chest pain  NO  Shortness of breath  NO  Profound Loss of smell/taste  NO  Nausea/Vomiting   NO  Loose stool/Diarrhea  NO  any skin issues    Patient Risk Factors Reviewed as follows:    NO  have you or any one in your home have had or has a pending covid test  NO  have you been in Close contact with confirmed COVID19 patient   NO  History of recent travel to affected geographical areas within the past 14 days  NO  COPD  NO  Active Cancer/Leukemia/Lymphoma/Chemotherapy  NO  Oral steroid use  NO  Pregnant  NO  Diabetes Mellitus  NO  Heart disease  NO  Asthma  NO Health care worker at home  NO Health care worker  NO Is there a Pregnant Woman in the home  NO Dialysis pt in the home   NO a large number of people living in the home

## 2021-03-23 NOTE — PATIENT INSTRUCTIONS
We are changing your pressure pill hydrochlorothiazide to valsartan HCT to better help your pressure and kidney. We'll do labs at your summer visit.

## 2021-03-26 ENCOUNTER — TELEPHONE (OUTPATIENT)
Dept: FAMILY MEDICINE CLINIC | Age: 86
End: 2021-03-26

## 2021-03-26 NOTE — TELEPHONE ENCOUNTER
I called and sp/w Elizabeth Caputo, Pharmacist at [facility]. She has questions about the recent order for Questran Light. 1. The previous order was for:  Questran 4 gram packets, take 1 packet by mouth daily in liquid or drink; the new order is for Questran Light    2. Do you want the can or the packets? 3. The can is a 210g can. 4. The scoop that comes in the can would make it difficult to measure out 1 gram    Please clarify.

## 2021-03-29 NOTE — TELEPHONE ENCOUNTER
You  Bobby Garcia LPN Just now (12:09 AM)     I called the pharmacy and sp/w Shahla Records. This has been handled. Thanks, [employer], MD Bobby Caicedo LPN 38 minutes ago (08:49 AM)     I did order the cholestyramine in a can. The pharmacy needs to know that it is what I want. Please inform them.  Dr. Walt Zamora text

## 2021-05-04 DIAGNOSIS — I10 BENIGN ESSENTIAL HTN: ICD-10-CM

## 2021-05-04 RX ORDER — AMLODIPINE BESYLATE 5 MG/1
TABLET ORAL
Qty: 90 TAB | Refills: 3 | Status: SHIPPED | OUTPATIENT
Start: 2021-05-04 | End: 2022-03-22

## 2021-09-14 ENCOUNTER — OFFICE VISIT (OUTPATIENT)
Dept: FAMILY MEDICINE CLINIC | Age: 86
End: 2021-09-14
Payer: MEDICARE

## 2021-09-14 VITALS
TEMPERATURE: 97.9 F | SYSTOLIC BLOOD PRESSURE: 118 MMHG | BODY MASS INDEX: 22.97 KG/M2 | DIASTOLIC BLOOD PRESSURE: 68 MMHG | HEIGHT: 60 IN | HEART RATE: 69 BPM | OXYGEN SATURATION: 97 % | WEIGHT: 117 LBS | RESPIRATION RATE: 16 BRPM

## 2021-09-14 DIAGNOSIS — E78.00 PURE HYPERCHOLESTEROLEMIA: ICD-10-CM

## 2021-09-14 DIAGNOSIS — Z00.00 MEDICARE ANNUAL WELLNESS VISIT, SUBSEQUENT: Primary | ICD-10-CM

## 2021-09-14 DIAGNOSIS — R35.0 URINARY FREQUENCY: ICD-10-CM

## 2021-09-14 DIAGNOSIS — Z13.39 SCREENING FOR ALCOHOLISM: ICD-10-CM

## 2021-09-14 DIAGNOSIS — Z23 ENCOUNTER FOR IMMUNIZATION: ICD-10-CM

## 2021-09-14 DIAGNOSIS — R19.5 LOOSE STOOLS: ICD-10-CM

## 2021-09-14 DIAGNOSIS — N18.30 STAGE 3 CHRONIC KIDNEY DISEASE, UNSPECIFIED WHETHER STAGE 3A OR 3B CKD (HCC): ICD-10-CM

## 2021-09-14 DIAGNOSIS — I10 ESSENTIAL HYPERTENSION: ICD-10-CM

## 2021-09-14 DIAGNOSIS — Z13.31 SCREENING FOR DEPRESSION: ICD-10-CM

## 2021-09-14 DIAGNOSIS — I25.10 ASCVD (ARTERIOSCLEROTIC CARDIOVASCULAR DISEASE): ICD-10-CM

## 2021-09-14 LAB
BILIRUB UR QL STRIP: NEGATIVE
GLUCOSE UR-MCNC: NEGATIVE MG/DL
KETONES P FAST UR STRIP-MCNC: NEGATIVE MG/DL
PH UR STRIP: 5 [PH] (ref 4.6–8)
PROT UR QL STRIP: NEGATIVE
SP GR UR STRIP: 1.01 (ref 1–1.03)
UA UROBILINOGEN AMB POC: NORMAL (ref 0.2–1)
URINALYSIS CLARITY POC: CLEAR
URINALYSIS COLOR POC: YELLOW
URINE BLOOD POC: NORMAL
URINE LEUKOCYTES POC: NEGATIVE
URINE NITRITES POC: NEGATIVE

## 2021-09-14 PROCEDURE — G0008 ADMIN INFLUENZA VIRUS VAC: HCPCS | Performed by: FAMILY MEDICINE

## 2021-09-14 PROCEDURE — G8427 DOCREV CUR MEDS BY ELIG CLIN: HCPCS | Performed by: FAMILY MEDICINE

## 2021-09-14 PROCEDURE — 90694 VACC AIIV4 NO PRSRV 0.5ML IM: CPT | Performed by: FAMILY MEDICINE

## 2021-09-14 PROCEDURE — G8510 SCR DEP NEG, NO PLAN REQD: HCPCS | Performed by: FAMILY MEDICINE

## 2021-09-14 PROCEDURE — G0439 PPPS, SUBSEQ VISIT: HCPCS | Performed by: FAMILY MEDICINE

## 2021-09-14 PROCEDURE — 1101F PT FALLS ASSESS-DOCD LE1/YR: CPT | Performed by: FAMILY MEDICINE

## 2021-09-14 PROCEDURE — 36415 COLL VENOUS BLD VENIPUNCTURE: CPT | Performed by: FAMILY MEDICINE

## 2021-09-14 PROCEDURE — G8420 CALC BMI NORM PARAMETERS: HCPCS | Performed by: FAMILY MEDICINE

## 2021-09-14 PROCEDURE — G0444 DEPRESSION SCREEN ANNUAL: HCPCS | Performed by: FAMILY MEDICINE

## 2021-09-14 PROCEDURE — 81003 URINALYSIS AUTO W/O SCOPE: CPT | Performed by: FAMILY MEDICINE

## 2021-09-14 PROCEDURE — G8536 NO DOC ELDER MAL SCRN: HCPCS | Performed by: FAMILY MEDICINE

## 2021-09-14 RX ORDER — CHOLESTYRAMINE 4 G/4.8G
4 POWDER, FOR SUSPENSION ORAL
Qty: 42 PACKET | Refills: 0 | Status: SHIPPED | OUTPATIENT
Start: 2021-09-14 | End: 2021-09-15 | Stop reason: SDUPTHER

## 2021-09-14 RX ORDER — CHOLESTYRAMINE 4 G/5G
1 POWDER, FOR SUSPENSION ORAL DAILY
Qty: 231 G | Refills: 5 | Status: SHIPPED | OUTPATIENT
Start: 2021-09-14 | End: 2021-09-15 | Stop reason: SDUPTHER

## 2021-09-14 NOTE — PROGRESS NOTES
Kenya Nichols is a 80 y.o. female and presents for annual Medicare Wellness Visit. Chief Complaint   Patient presents with    Diarrhea    Annual Wellness Visit    Altered mental status     Pain Scale: /10  Pain Location:     1. Have you been to the ER, urgent care clinic since your last visit? Hospitalized since your last visit? No  2. Have you seen or consulted any other health care providers outside of the [address] [place] [address] since your last visit? Include any pap smears or colon screening. No    HPI:  Hypertension. Blood pressures have been in goal, but a little higher today than before. Management at last visit included con't current tx. Current regimen: beta-blocker, calcium channel blocker and thiazide diuretic. Symptoms include no sx. Denies CP, palpitations, or syncope. Lab review:   Lab Results   Component Value Date/Time    Sodium 142 10/05/2020 09:56 AM    Potassium 3.5 10/05/2020 09:56 AM    Chloride 103 10/05/2020 09:56 AM    CO2 26 10/05/2020 09:56 AM    Anion gap 11 02/10/2020 10:07 AM    Glucose 98 10/05/2020 09:56 AM    BUN 28 (H) 10/05/2020 09:56 AM    Creatinine 1.67 (H) 10/05/2020 09:56 AM    BUN/Creatinine ratio 17 10/05/2020 09:56 AM    GFR est AA 32 (L) 10/05/2020 09:56 AM    GFR est non-AA 28 (L) 10/05/2020 09:56 AM    Calcium 9.8 10/05/2020 09:56 AM     Hyperlipidemia. On lipitor 80mg. Taran well. No myalgias, arthralgias, unusual weakness. Pretty much in goal on 11/2018 check. Lab Results   Component Value Date/Time    Cholesterol, total 189 07/06/2020 10:16 AM    HDL Cholesterol 59 07/06/2020 10:16 AM    LDL, calculated 109 (H) 07/06/2020 10:16 AM    VLDL, calculated 21 07/06/2020 10:16 AM    Triglyceride 105 07/06/2020 10:16 AM     Lab Results   Component Value Date/Time    ALT (SGPT) 14 07/06/2020 10:16 AM    Alk. phosphatase 94 07/06/2020 10:16 AM    Bilirubin, total 0.4 07/06/2020 10:16 AM     Loose stool  Better with cholestyramine. Comes and goes.  Well tolerated, but lately has run out, maybe some issue with the insurance, pt's daughter will check on that. PMH, SH, Medications/Allergies: reviewed, on chart. Current Outpatient Medications   Medication Sig    Cholestyramine-Aspartame (Questran Light) 4 gram powder Take 1 g by mouth daily. Indications: stool and cholesterol    cholestyramine-aspartame (QUESTRAN LIGHT) 4 gram packet Take 1 Packet by mouth two (2) times daily as needed (loose stool).  amLODIPine (NORVASC) 5 mg tablet TAKE 1 TABLET EVERY DAY  FOR  PRESSURE  AND  HEART    dorzolamide-timoloL (COSOPT) 22.3-6.8 mg/mL ophthalmic solution Administer 1 Drop to both eyes two (2) times a day.  valsartan-hydroCHLOROthiazide (DIOVAN-HCT) 80-12.5 mg per tablet Take 1 Tab by mouth daily. Indications: pressure, heart, and kidney    atorvastatin (LIPITOR) 40 mg tablet TAKE 1 TABLET EVERY DAY FOR CHOLESTEROL, HEART (NEW DOSE)    metoprolol succinate (TOPROL-XL) 50 mg XL tablet TAKE 1 TABLET EVERY DAY    cholestyramine (QUESTRAN) 4 gram packet Take 1 Packet by mouth daily as needed for Diarrhea.  aspirin delayed-release 81 mg tablet Take 1 Tab by mouth daily.  multivit-mineral-iron-lutein (CENTRUM SILVER ULTRA WOMEN'S) tab tablet Take 1 Tab by mouth daily. No current facility-administered medications for this visit.       ROS:  Constitutional: No fever, chills or weight loss  Respiratory: No cough, SOB   CV: No chest pain or Palpitations    Visit Vitals  /68 (BP 1 Location: Right arm)   Pulse 69   Temp 97.9 °F (36.6 °C) (Temporal)   Resp 16   Ht 5' (1.524 m)   Wt 117 lb (53.1 kg)   SpO2 97%   BMI 22.85 kg/m²     Wt Readings from Last 3 Encounters:   09/14/21 117 lb (53.1 kg)   03/23/21 122 lb (55.3 kg)   10/05/20 123 lb 3.2 oz (55.9 kg)   -1#  BP Readings from Last 3 Encounters:   09/14/21 118/68   03/23/21 (!) 142/82   10/05/20 132/78     Physical Examination: General appearance - alert, well appearing, and in no distress  Mental status - alert, oriented to person, place, and time  Eyes - pupils equal and reactive, extraocular eye movements intact  ENT - bilateral external ears and nose normal. Normal lips  Neck - supple, no significant adenopathy, no thyromegaly or mass  Lymphatics - no palpable lymphadenopathy, no hepatosplenomegaly  Chest - clear to auscultation, no wheezes, rales or rhonchi, symmetric air entry  Heart - normal rate, irregular rhythm, normal S1, S2, no murmurs, rubs, clicks or gallops  Extremities - peripheral pulses normal, no pedal edema, no clubbing or cyanosis    A/p:  HTN and CKD3, in goal. Con't toprol-XL 50mg daily and norvasc 5mg/d, valsartan HCT 80/12.5mg/d (avoid ACEI due to hx ACEI cough). Check labs. HLD and ASCVD  Doing well on lipitor 40mg, questran, and ASA 81mg/d. Recheck labs, adj PRN. Change in bowel habits  Doing well overall on questran, ok to adjust dose to half packet daily or other fraction to get stools firm but not too hard. Just needs RF. Ore.    F/U 3mo/PRN      __________________________________________________________    Problem List: Reviewed with patient and discussed risk factors. 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 Empaneled Provider    PMH, SH, Medications/Allergies: reviewed, on chart. Female Alcohol Screening:   On any occasion during the past 3 months, have you had more than 4 drinks containing alcohol? No    Do you average more than 14 drinks per week? No    ROS:  Constitutional: No fever, chills or weight loss  Respiratory: No cough, SOB   CV: No chest pain or Palpitations    Objective:  Visit Vitals  /68 (BP 1 Location: Right arm)   Pulse 69   Temp 97.9 °F (36.6 °C) (Temporal)   Resp 16   Ht 5' (1.524 m)   Wt 117 lb (53.1 kg)   SpO2 97%   BMI 22.85 kg/m²    Body mass index is 22.85 kg/m². Assessment of cognitive impairment: Alert and oriented x 3  Mini-cog: abn Clock, 3/3 recall    Depression Screen:   3 most recent PHQ Screens 9/14/2021   PHQ Not Done -   Little interest or pleasure in doing things Not at all   Feeling down, depressed, irritable, or hopeless Not at all   Total Score PHQ 2 0     Depression screening performed and reviewed with patient for 8-15 minutes    Fall Risk Assessment:    Fall Risk Assessment, last 12 mths 9/14/2021   Able to walk? Yes   Fall in past 12 months? 1   Do you feel unsteady? 0   Are you worried about falling 0   Is the gait abnormal? 0   Number of falls in past 12 months 1   Fall with injury? 1       Functional Ability:   Does the patient exhibit a steady gait? yes   How long did it take the patient to get up and walk from a sitting position? 3sec   Is the patient self reliant?  (ie can do own laundry, meals, household chores)  yes     Does the patient handle his/her own medications? yes     Does the patient handle his/her own money? yes     Is the patients home safe (ie good lighting, handrails on stairs and bath, etc.)? yes     Did you notice or did patient express any hearing difficulties? yes     Did you notice or did patient express any vision difficulties? yes       Advance Care Planning:   Patient was offered the opportunity to discuss advance care planning:  yes     Does patient have an Advance Directive:  yes   If no, did you provide information on Caring Connections?   NA       Plan:        Orders Placed This Encounter    Depression Screen Annual    FLU (FLUAD QUAD INFLUENZA VACCINE,QUAD,ADJUVANTED)    METABOLIC PANEL, COMPREHENSIVE    LIPID PANEL    AMB POC URINALYSIS DIP STICK AUTO W/O MICRO    ADMIN INFLUENZA VIRUS VAC    Cholestyramine-Aspartame (Questran Light) 4 gram powder    cholestyramine-aspartame (QUESTRAN LIGHT) 4 gram packet       Health Maintenance   Topic Date Due    Lipid Screen  2021    Flu Vaccine (1) 2021    Shingrix Vaccine Age 50> (1 of 2) 2030 (Originally 1985)    Medicare Yearly Exam  09/15/2022    DTaP/Tdap/Td series (2 - Td or Tdap) 2027    Bone Densitometry (Dexa) Screening  Completed    COVID-19 Vaccine  Completed    Pneumococcal 65+ years  Completed       *Patient verbalized understanding and agreement with the plan. A copy of the After Visit Summary with personalized health plan was given to the patient today. Identified pt with two pt identifiers(name and ). Reviewed record in preparation for visit and have obtained necessary documentation.

## 2021-09-14 NOTE — PROGRESS NOTES
After obtaining consent, and per orders of Dr. Jj Sanchez, injection of Fluad   given by Og Gonzalez LPN. Patient instructed to remain in clinic for 20 minutes afterwards, and to report any adverse reaction to me immediately.

## 2021-09-15 DIAGNOSIS — R19.5 LOOSE STOOLS: ICD-10-CM

## 2021-09-15 DIAGNOSIS — E78.00 PURE HYPERCHOLESTEROLEMIA: ICD-10-CM

## 2021-09-15 LAB
ALBUMIN SERPL-MCNC: 3.8 G/DL (ref 3.5–5)
ALBUMIN/GLOB SERPL: 1.1 {RATIO} (ref 1.1–2.2)
ALP SERPL-CCNC: 72 U/L (ref 45–117)
ALT SERPL-CCNC: 25 U/L (ref 12–78)
ANION GAP SERPL CALC-SCNC: 5 MMOL/L (ref 5–15)
AST SERPL-CCNC: 40 U/L (ref 15–37)
BILIRUB SERPL-MCNC: 0.5 MG/DL (ref 0.2–1)
BUN SERPL-MCNC: 36 MG/DL (ref 6–20)
BUN/CREAT SERPL: 18 (ref 12–20)
CALCIUM SERPL-MCNC: 9.3 MG/DL (ref 8.5–10.1)
CHLORIDE SERPL-SCNC: 103 MMOL/L (ref 97–108)
CHOLEST SERPL-MCNC: 190 MG/DL
CO2 SERPL-SCNC: 28 MMOL/L (ref 21–32)
CREAT SERPL-MCNC: 2.03 MG/DL (ref 0.55–1.02)
GLOBULIN SER CALC-MCNC: 3.5 G/DL (ref 2–4)
GLUCOSE SERPL-MCNC: 94 MG/DL (ref 65–100)
HDLC SERPL-MCNC: 58 MG/DL
HDLC SERPL: 3.3 {RATIO} (ref 0–5)
LDLC SERPL CALC-MCNC: 103.6 MG/DL (ref 0–100)
POTASSIUM SERPL-SCNC: 3.8 MMOL/L (ref 3.5–5.1)
PROT SERPL-MCNC: 7.3 G/DL (ref 6.4–8.2)
SODIUM SERPL-SCNC: 136 MMOL/L (ref 136–145)
TRIGL SERPL-MCNC: 142 MG/DL (ref ?–150)
VLDLC SERPL CALC-MCNC: 28.4 MG/DL

## 2021-09-15 RX ORDER — CHOLESTYRAMINE 4 G/4.8G
4 POWDER, FOR SUSPENSION ORAL
Qty: 42 PACKET | Refills: 11 | Status: SHIPPED | OUTPATIENT
Start: 2021-09-15 | End: 2022-04-20

## 2021-09-15 RX ORDER — VALSARTAN 80 MG/1
80 TABLET ORAL 2 TIMES DAILY
Qty: 180 TABLET | Refills: 3 | Status: SHIPPED | OUTPATIENT
Start: 2021-09-15 | End: 2021-10-05 | Stop reason: SDUPTHER

## 2021-09-15 NOTE — TELEPHONE ENCOUNTER
----- Message from Kurt Downey sent at 9/15/2021  9:16 AM EDT -----  Regarding: Dr. Neff Askew: [phone]  General Message/Vendor Calls    Caller's first and last name:Kala Alegria, daughter      Reason for call:Pts daughter would like a call back to discuss getting a new prescription for the pt.        Callback required yes/no and why:Yes, discuss      Best contact number(s):[phone]      Details to clarify the request:n/a      Kurt Downey

## 2021-09-15 NOTE — PROGRESS NOTES
Kidneys are weaker. Change valsartan HCT to plain valsartan 80mg twice daily, should help. Cholesterol good.

## 2021-09-15 NOTE — TELEPHONE ENCOUNTER
Daughter states that the script written yesterday for powder for her stools has a $300 copay. Questions if alternative can be called in. Using [facility] in [place] .

## 2021-09-16 ENCOUNTER — TELEPHONE (OUTPATIENT)
Dept: FAMILY MEDICINE CLINIC | Age: 86
End: 2021-09-16

## 2021-09-16 NOTE — TELEPHONE ENCOUNTER
Caregiver called the emergency line and stated that Richardson Bendon hasn't gotten any better. Still getting up 6-7 times per night to urinate and not producing much urine. Pt seems very dehydrated (based on her observation) has been giving her water and powerade to assist with this. Stated she is still complaining of pain where she fell. Seen by pcp on Tuesday. Requesting imaging to see if anything may be broken. Checked U/A done on Tuesday which was clear. Was instructed to bring another sample in tomorrow to run and would discuss further care with PCP when he comes back tomorrow.

## 2021-09-17 ENCOUNTER — OFFICE VISIT (OUTPATIENT)
Dept: FAMILY MEDICINE CLINIC | Age: 86
End: 2021-09-17
Payer: MEDICARE

## 2021-09-17 VITALS
BODY MASS INDEX: 22.89 KG/M2 | WEIGHT: 116.6 LBS | TEMPERATURE: 97.8 F | OXYGEN SATURATION: 98 % | HEIGHT: 60 IN | RESPIRATION RATE: 18 BRPM | HEART RATE: 90 BPM | SYSTOLIC BLOOD PRESSURE: 132 MMHG | DIASTOLIC BLOOD PRESSURE: 60 MMHG

## 2021-09-17 DIAGNOSIS — N18.30 STAGE 3 CHRONIC KIDNEY DISEASE, UNSPECIFIED WHETHER STAGE 3A OR 3B CKD (HCC): ICD-10-CM

## 2021-09-17 DIAGNOSIS — R35.0 FREQUENCY OF URINATION: Primary | ICD-10-CM

## 2021-09-17 DIAGNOSIS — R35.1 NOCTURIA: ICD-10-CM

## 2021-09-17 DIAGNOSIS — I10 ESSENTIAL HYPERTENSION: ICD-10-CM

## 2021-09-17 LAB
BILIRUB UR QL STRIP: NEGATIVE
GLUCOSE UR-MCNC: NEGATIVE MG/DL
KETONES P FAST UR STRIP-MCNC: NEGATIVE MG/DL
PH UR STRIP: 5 [PH] (ref 4.6–8)
PROT UR QL STRIP: NEGATIVE
SP GR UR STRIP: 1.01 (ref 1–1.03)
UA UROBILINOGEN AMB POC: NORMAL (ref 0.2–1)
URINALYSIS CLARITY POC: CLEAR
URINALYSIS COLOR POC: YELLOW
URINE BLOOD POC: NEGATIVE
URINE LEUKOCYTES POC: NEGATIVE
URINE NITRITES POC: NEGATIVE

## 2021-09-17 PROCEDURE — G8420 CALC BMI NORM PARAMETERS: HCPCS | Performed by: FAMILY MEDICINE

## 2021-09-17 PROCEDURE — G8427 DOCREV CUR MEDS BY ELIG CLIN: HCPCS | Performed by: FAMILY MEDICINE

## 2021-09-17 PROCEDURE — G8536 NO DOC ELDER MAL SCRN: HCPCS | Performed by: FAMILY MEDICINE

## 2021-09-17 PROCEDURE — 1090F PRES/ABSN URINE INCON ASSESS: CPT | Performed by: FAMILY MEDICINE

## 2021-09-17 PROCEDURE — G8510 SCR DEP NEG, NO PLAN REQD: HCPCS | Performed by: FAMILY MEDICINE

## 2021-09-17 PROCEDURE — 81002 URINALYSIS NONAUTO W/O SCOPE: CPT | Performed by: FAMILY MEDICINE

## 2021-09-17 PROCEDURE — 99213 OFFICE O/P EST LOW 20 MIN: CPT | Performed by: FAMILY MEDICINE

## 2021-09-17 PROCEDURE — 1101F PT FALLS ASSESS-DOCD LE1/YR: CPT | Performed by: FAMILY MEDICINE

## 2021-09-17 NOTE — PROGRESS NOTES
Rocael Diaz is a 80 y.o. female    HPI:  Symptoms include nocturia, 3-4x/night. Onset of symptoms was about a month ago. stable since that time. Evaluation to date: seen previously and diuretic was discontinued. Treatment to date: supposed to be d/c diuretic, but has not changed it yet. PMH, SH, Medications/Allergies: reviewed, on chart. Current Outpatient Medications   Medication Sig    cholestyramine-aspartame (QUESTRAN LIGHT) 4 gram packet Take 1 Packet by mouth two (2) times daily as needed (loose stool).  valsartan (DIOVAN) 80 mg tablet Take 1 Tablet by mouth two (2) times a day. Indications: pressure    amLODIPine (NORVASC) 5 mg tablet TAKE 1 TABLET EVERY DAY  FOR  PRESSURE  AND  HEART    dorzolamide-timoloL (COSOPT) 22.3-6.8 mg/mL ophthalmic solution Administer 1 Drop to both eyes two (2) times a day.  atorvastatin (LIPITOR) 40 mg tablet TAKE 1 TABLET EVERY DAY FOR CHOLESTEROL, HEART (NEW DOSE)    metoprolol succinate (TOPROL-XL) 50 mg XL tablet TAKE 1 TABLET EVERY DAY    aspirin delayed-release 81 mg tablet Take 1 Tab by mouth daily.  multivit-mineral-iron-lutein (CENTRUM SILVER ULTRA WOMEN'S) tab tablet Take 1 Tab by mouth daily. No current facility-administered medications for this visit.       ROS:  Constitutional: No fever, chills or abnormal weight loss  Respiratory: No cough, SOB   CV: No chest pain or Palpitations    Visit Vitals  /60 (BP 1 Location: Right arm, BP Patient Position: Sitting, BP Cuff Size: Adult)   Pulse 90   Temp 97.8 °F (36.6 °C) (Temporal)   Resp 18   Ht 5' (1.524 m)   Wt 116 lb 9.6 oz (52.9 kg)   SpO2 98%   BMI 22.77 kg/m²   -1#  Wt Readings from Last 3 Encounters:   09/17/21 116 lb 9.6 oz (52.9 kg)   09/14/21 117 lb (53.1 kg)   03/23/21 122 lb (55.3 kg)     BP Readings from Last 3 Encounters:   09/17/21 132/60   09/14/21 118/68   03/23/21 (!) 142/82     Physical Examination: General appearance - alert, well appearing, and in no distress  Mental status - alert, oriented to person, place, and time  Eyes - pupils equal and reactive, extraocular eye movements intact  ENT - bilateral external ears and nose normal. Normal lips  Neck - supple, no significant adenopathy, no thyromegaly or mass  Chest - clear to auscultation, no wheezes, rales or rhonchi, symmetric air entry  Heart - normal rate, regular rhythm, normal S1, S2, no murmurs, rubs, clicks or gallops  Abd - NABS. SNT, no HSM/Mass. No CVA ttp  Extremities - peripheral pulses normal, no pedal edema, no clubbing or cyanosis    Results for orders placed or performed in visit on 09/17/21   AMB POC URINALYSIS DIP STICK MANUAL W/ MICRO   Result Value Ref Range    Color (UA POC) Yellow     Clarity (UA POC) Clear     Glucose (UA POC) Negative Negative    Bilirubin (UA POC) Negative Negative    Ketones (UA POC) Negative Negative    Specific gravity (UA POC) 1.015 1.001 - 1.035    Blood (UA POC) Negative Negative    pH (UA POC) 5.0 4.6 - 8.0    Protein (UA POC) Negative Negative    Urobilinogen (UA POC) 0.2 mg/dL 0.2 - 1    Nitrites (UA POC) Negative Negative    Leukocyte esterase (UA POC) Negative Negative     A/p:  Nocturia  Try again to d/c thiazide and go on plain diovan + norvasc. conisder consolidation to exforge. Check urine culture. Plan sent results by letter too.

## 2021-09-17 NOTE — PROGRESS NOTES
Margarita Macdonald is a 80 y.o. female presenting for/with:    Chief Complaint   Patient presents with    Dehydration     follow up       Visit Vitals  /60 (BP 1 Location: Right arm, BP Patient Position: Sitting, BP Cuff Size: Adult)   Pulse 90   Temp 97.8 °F (36.6 °C) (Temporal)   Resp 18   Ht 5' (1.524 m)   Wt 116 lb 9.6 oz (52.9 kg)   SpO2 98%   BMI 22.77 kg/m²     Pain Scale: 0 - No pain/10  Pain Location:     1. Have you been to the ER, urgent care clinic since your last visit? Hospitalized since your last visit? NO    2. Have you seen or consulted any other health care providers outside of the [address] [place] [address] since your last visit? Include any pap smears or colon screening. NO    Symptom review:  NO  Fever   NO  Shaking chills  NO  Cough  NO  Body aches  NO  Coughing up blood  NO  Chest congestion  NO  Chest pain  NO  Shortness of breath  NO  Profound Loss of smell/taste  NO  Nausea/Vomiting   NO  Loose stool/Diarrhea  NO  any skin issues    Patient Risk Factors Reviewed as follows:  NO  have you been in Close contact with confirmed COVID19 patient   NO  History of recent travel to affected geographical areas within the past 14 days  NO  COPD  NO  Active Cancer/Leukemia/Lymphoma/Chemotherapy  NO  Oral steroid use  NO  Pregnant  NO  Diabetes Mellitus  NO  Heart disease  NO  Asthma  NO Health care worker at home  [address] care worker  NO Is there a Pregnant Woman in the home  NO Dialysis pt in the home   NO a large number of people living in the home    Learning Assessment 9/14/2021   PRIMARY LEARNER Patient   PRIMARY LANGUAGE ENGLISH   LEARNER PREFERENCE PRIMARY OTHER (COMMENT)   ANSWERED BY patient   RELATIONSHIP SELF     Fall Risk Assessment, last 12 mths 9/17/2021   Able to walk? Yes   Fall in past 12 months? 1   Do you feel unsteady? 1   Are you worried about falling 0   Is TUG test greater than 12 seconds? 0   Is the gait abnormal? 1   Number of falls in past 12 months 1   Fall with injury?  0 3 most recent PHQ Screens 9/17/2021   PHQ Not Done -   Little interest or pleasure in doing things Not at all   Feeling down, depressed, irritable, or hopeless Not at all   Total Score PHQ 2 0     Abuse Screening Questionnaire 9/17/2021   Do you ever feel afraid of your partner? N   Are you in a relationship with someone who physically or mentally threatens you? N   Is it safe for you to go home? Y       ADL Assessment 9/17/2021   Feeding yourself No Help Needed   Getting from bed to chair No Help Needed   Getting dressed No Help Needed   Bathing or showering No Help Needed   Walk across the room (includes cane/walker) No Help Needed   Using the telphone No Help Needed   Taking your medications No Help Needed   Preparing meals No Help Needed   Managing money (expenses/bills) No Help Needed   Moderately strenuous housework (laundry) No Help Needed   Shopping for personal items (toiletries/medicines) No Help Needed   Shopping for groceries No Help Needed   Driving No Help Needed   Climbing a flight of stairs No Help Needed   Getting to places beyond walking distances No Help Needed      Advanced directives scanned in chart.  Verified emerg contacts     Results for orders placed or performed in visit on 09/17/21   AMB POC URINALYSIS DIP STICK MANUAL W/ MICRO   Result Value Ref Range    Color (UA POC) Yellow     Clarity (UA POC) Clear     Glucose (UA POC) Negative Negative    Bilirubin (UA POC) Negative Negative    Ketones (UA POC) Negative Negative    Specific gravity (UA POC) 1.015 1.001 - 1.035    Blood (UA POC) Negative Negative    pH (UA POC) 5.0 4.6 - 8.0    Protein (UA POC) Negative Negative    Urobilinogen (UA POC) 0.2 mg/dL 0.2 - 1    Nitrites (UA POC) Negative Negative    Leukocyte esterase (UA POC) Negative Negative

## 2021-10-05 DIAGNOSIS — I10 ESSENTIAL HYPERTENSION: ICD-10-CM

## 2021-10-05 DIAGNOSIS — N18.30 STAGE 3 CHRONIC KIDNEY DISEASE, UNSPECIFIED WHETHER STAGE 3A OR 3B CKD (HCC): ICD-10-CM

## 2021-10-05 RX ORDER — VALSARTAN 80 MG/1
80 TABLET ORAL 2 TIMES DAILY
Qty: 180 TABLET | Refills: 3 | Status: SHIPPED | OUTPATIENT
Start: 2021-10-05 | End: 2021-10-12 | Stop reason: SDUPTHER

## 2021-10-12 DIAGNOSIS — I10 ESSENTIAL HYPERTENSION: ICD-10-CM

## 2021-10-12 DIAGNOSIS — N18.30 STAGE 3 CHRONIC KIDNEY DISEASE, UNSPECIFIED WHETHER STAGE 3A OR 3B CKD (HCC): ICD-10-CM

## 2021-10-12 RX ORDER — VALSARTAN 80 MG/1
80 TABLET ORAL 2 TIMES DAILY
Qty: 180 TABLET | Refills: 3 | Status: SHIPPED | OUTPATIENT
Start: 2021-10-12 | End: 2022-04-20 | Stop reason: SDUPTHER

## 2021-10-15 ENCOUNTER — OFFICE VISIT (OUTPATIENT)
Dept: FAMILY MEDICINE CLINIC | Age: 86
End: 2021-10-15
Payer: MEDICARE

## 2021-10-15 VITALS
RESPIRATION RATE: 20 BRPM | TEMPERATURE: 97.6 F | SYSTOLIC BLOOD PRESSURE: 118 MMHG | DIASTOLIC BLOOD PRESSURE: 50 MMHG | WEIGHT: 119.38 LBS | HEART RATE: 89 BPM | OXYGEN SATURATION: 99 % | HEIGHT: 65 IN | BODY MASS INDEX: 19.89 KG/M2

## 2021-10-15 DIAGNOSIS — I10 PRIMARY HYPERTENSION: ICD-10-CM

## 2021-10-15 DIAGNOSIS — N18.30 STAGE 3 CHRONIC KIDNEY DISEASE, UNSPECIFIED WHETHER STAGE 3A OR 3B CKD (HCC): Primary | ICD-10-CM

## 2021-10-15 DIAGNOSIS — I25.10 ASCVD (ARTERIOSCLEROTIC CARDIOVASCULAR DISEASE): ICD-10-CM

## 2021-10-15 PROCEDURE — G8427 DOCREV CUR MEDS BY ELIG CLIN: HCPCS | Performed by: FAMILY MEDICINE

## 2021-10-15 PROCEDURE — 1090F PRES/ABSN URINE INCON ASSESS: CPT | Performed by: FAMILY MEDICINE

## 2021-10-15 PROCEDURE — G8420 CALC BMI NORM PARAMETERS: HCPCS | Performed by: FAMILY MEDICINE

## 2021-10-15 PROCEDURE — 99214 OFFICE O/P EST MOD 30 MIN: CPT | Performed by: FAMILY MEDICINE

## 2021-10-15 PROCEDURE — G8510 SCR DEP NEG, NO PLAN REQD: HCPCS | Performed by: FAMILY MEDICINE

## 2021-10-15 PROCEDURE — G8536 NO DOC ELDER MAL SCRN: HCPCS | Performed by: FAMILY MEDICINE

## 2021-10-15 PROCEDURE — 1101F PT FALLS ASSESS-DOCD LE1/YR: CPT | Performed by: FAMILY MEDICINE

## 2021-10-15 NOTE — PROGRESS NOTES
Cha Evans is a 80 y.o. female     Chief Complaint   Patient presents with    Hypertension    Follow-up    Cholesterol Problem     Pain Scale: /10  Pain Location:     1. Have you been to the ER, urgent care clinic since your last visit? Hospitalized since your last visit? No  2. Have you seen or consulted any other health care providers outside of the [address] [place] [address] since your last visit? Include any pap smears or colon screening. No    HPI:  Hypertension. Blood pressures have been improving. Management at last visit included d/c thiazide due to AMANDA. Current regimen: beta-blocker, calcium channel blocker. Symptoms include no sx. Denies CP, palpitations, or syncope. Lab review:   Lab Results   Component Value Date/Time    Sodium 136 09/14/2021 12:15 PM    Potassium 3.8 09/14/2021 12:15 PM    Chloride 103 09/14/2021 12:15 PM    CO2 28 09/14/2021 12:15 PM    Anion gap 5 09/14/2021 12:15 PM    Glucose 94 09/14/2021 12:15 PM    BUN 36 (H) 09/14/2021 12:15 PM    Creatinine 2.03 (H) 09/14/2021 12:15 PM    BUN/Creatinine ratio 18 09/14/2021 12:15 PM    GFR est AA 28 (L) 09/14/2021 12:15 PM    GFR est non-AA 23 (L) 09/14/2021 12:15 PM    Calcium 9.3 09/14/2021 12:15 PM     Hyperlipidemia. On lipitor 80mg. Taran well. No myalgias, arthralgias, unusual weakness. Pretty much in goal on 11/2018 check. Lab Results   Component Value Date/Time    Cholesterol, total 190 09/14/2021 12:15 PM    HDL Cholesterol 58 09/14/2021 12:15 PM    LDL, calculated 103.6 (H) 09/14/2021 12:15 PM    VLDL, calculated 28.4 09/14/2021 12:15 PM    Triglyceride 142 09/14/2021 12:15 PM    CHOL/HDL Ratio 3.3 09/14/2021 12:15 PM     Lab Results   Component Value Date/Time    ALT (SGPT) 25 09/14/2021 12:15 PM    Alk. phosphatase 72 09/14/2021 12:15 PM    Bilirubin, total 0.5 09/14/2021 12:15 PM     Loose stool  Better with cholestyramine. Comes and goes.  Well tolerated, but lately has run out, maybe some issue with the insurance, pt's daughter will check on that. PMH, SH, Medications/Allergies: reviewed, on chart. Current Outpatient Medications   Medication Sig    valsartan (DIOVAN) 80 mg tablet Take 1 Tablet by mouth two (2) times a day. Indications: pressure    cholestyramine-aspartame (QUESTRAN LIGHT) 4 gram packet Take 1 Packet by mouth two (2) times daily as needed (loose stool).  amLODIPine (NORVASC) 5 mg tablet TAKE 1 TABLET EVERY DAY  FOR  PRESSURE  AND  HEART    dorzolamide-timoloL (COSOPT) 22.3-6.8 mg/mL ophthalmic solution Administer 1 Drop to both eyes two (2) times a day.  atorvastatin (LIPITOR) 40 mg tablet TAKE 1 TABLET EVERY DAY FOR CHOLESTEROL, HEART (NEW DOSE)    metoprolol succinate (TOPROL-XL) 50 mg XL tablet TAKE 1 TABLET EVERY DAY    aspirin delayed-release 81 mg tablet Take 1 Tab by mouth daily.  multivit-mineral-iron-lutein (CENTRUM SILVER ULTRA WOMEN'S) tab tablet Take 1 Tab by mouth daily. No current facility-administered medications for this visit.       ROS:  Constitutional: No fever, chills or weight loss  Respiratory: No cough, SOB   CV: No chest pain or Palpitations    Visit Vitals  BP (!) 118/50   Pulse 89   Temp 97.6 °F (36.4 °C) (Temporal)   Resp 20   Ht 5' 5\" (1.651 m)   Wt 119 lb 6 oz (54.1 kg)   SpO2 99%   BMI 19.87 kg/m²     Wt Readings from Last 3 Encounters:   10/15/21 119 lb 6 oz (54.1 kg)   09/17/21 116 lb 9.6 oz (52.9 kg)   09/14/21 117 lb (53.1 kg)   -1#  BP Readings from Last 3 Encounters:   10/15/21 (!) 118/50   09/17/21 132/60   09/14/21 118/68     Physical Examination: General appearance - alert, well appearing, and in no distress  Mental status - alert, oriented to person, place, and time  Eyes - pupils equal and reactive, extraocular eye movements intact  ENT - bilateral external ears and nose normal. Normal lips  Neck - supple, no significant adenopathy, no thyromegaly or mass  Lymphatics - no palpable lymphadenopathy, no hepatosplenomegaly  Chest - clear to auscultation, no wheezes, rales or rhonchi, symmetric air entry  Heart - normal rate, irregular rhythm, normal S1, S2, no murmurs, rubs, clicks or gallops  Extremities - peripheral pulses normal, no pedal edema, no clubbing or cyanosis    A/p:  HTN and CKD3. Doing better from sx standpoint. recheck labs now that she's on plain diovan + norvasc. conisder consolidation to exforge    HLD and ASCVD  Doing well on lipitor 40mg, questran, and ASA 81mg/d. Plan Recheck labs fall 2022, adj PRN. Change in bowel habits  Doing well overall on questran, half packet daily or other fraction to get stools firm but not too hard. F/U 3mo/PRN      1. Have you been to the ER, urgent care clinic since your last visit? Hospitalized since your last visit? No    2. Have you seen or consulted any other health care providers outside of the [address] [place] [address] since your last visit? Include any pap smears or colon screening.  No

## 2021-10-19 ENCOUNTER — TELEPHONE (OUTPATIENT)
Dept: FAMILY MEDICINE CLINIC | Age: 86
End: 2021-10-19

## 2021-10-19 NOTE — TELEPHONE ENCOUNTER
----- Message from Myriam Morrison sent at 10/19/2021  9:32 AM EDT -----  Subject: Message to Provider    QUESTIONS  Information for Provider? patient needs call back to clear up confusion   about medication, patient has connect with provider on [employer] but it was   unclear to patient  ---------------------------------------------------------------------------  --------------  [address]  What is the best way for the office to contact you? OK to leave message on   voicemail  Preferred Call Back Phone Number? [phone]  ---------------------------------------------------------------------------  --------------  SCRIPT ANSWERS  Relationship to Patient? Other  Representative Name? Sally Collier  Is the Representative on the appropriate HIPAA document in Epic?  Yes

## 2021-10-21 ENCOUNTER — TELEPHONE (OUTPATIENT)
Dept: FAMILY MEDICINE CLINIC | Age: 86
End: 2021-10-21

## 2021-10-21 NOTE — TELEPHONE ENCOUNTER
Pt calling due to increased bilateral lower leg swelling since starting Valsartan 80mg twice daily. No breathing difficulty or cough noted. She states she feels fine other than the leg swelling.     Please advise

## 2021-10-21 NOTE — LETTER
11/1/2021 3:37 PM    Ms. [address] [place] [address]        Our office has been trying to reach you to follow up about a medication concern you brought to our attention. Please call our office if you have any questions or further concerns. Have a great week.        Sincerely,      Cheri Flores MD

## 2021-10-25 ENCOUNTER — PATIENT MESSAGE (OUTPATIENT)
Dept: FAMILY MEDICINE CLINIC | Age: 86
End: 2021-10-25

## 2021-10-25 DIAGNOSIS — M79.671 FOOT PAIN, RIGHT: Primary | ICD-10-CM

## 2021-10-25 NOTE — TELEPHONE ENCOUNTER
Jigna García 10/25/2021 9:18 AM EDT      ----- Message -----  From: Jesús Rowland  Sent: 10/25/2021 9:03 AM EDT  To: [facility]  Subject: Referral Request     Good morning, I was wondering if I could get a prescription sent to [facility] for an x-ray of my right foot( the top half of my right foot ). I'm having some major issues and swelling associated with that and I want to get it looked at. Please send me a message letting me know it was sent.  I greatly appreciate it you can also call Rajesh Davenport at [phone] and let her know when that is done, thank you

## 2021-10-26 ENCOUNTER — OFFICE VISIT (OUTPATIENT)
Dept: FAMILY MEDICINE CLINIC | Age: 86
End: 2021-10-26
Payer: MEDICARE

## 2021-10-26 VITALS
WEIGHT: 115.4 LBS | SYSTOLIC BLOOD PRESSURE: 118 MMHG | HEIGHT: 65 IN | OXYGEN SATURATION: 99 % | TEMPERATURE: 97.9 F | HEART RATE: 80 BPM | RESPIRATION RATE: 16 BRPM | DIASTOLIC BLOOD PRESSURE: 52 MMHG | BODY MASS INDEX: 19.22 KG/M2

## 2021-10-26 DIAGNOSIS — I25.10 ASCVD (ARTERIOSCLEROTIC CARDIOVASCULAR DISEASE): ICD-10-CM

## 2021-10-26 DIAGNOSIS — I80.01 THROMBOPHLEBITIS OF SUPERFICIAL VEINS OF RIGHT LOWER EXTREMITY: Primary | ICD-10-CM

## 2021-10-26 DIAGNOSIS — I10 PRIMARY HYPERTENSION: ICD-10-CM

## 2021-10-26 DIAGNOSIS — N18.30 STAGE 3 CHRONIC KIDNEY DISEASE, UNSPECIFIED WHETHER STAGE 3A OR 3B CKD (HCC): ICD-10-CM

## 2021-10-26 DIAGNOSIS — M79.671 FOOT PAIN, RIGHT: ICD-10-CM

## 2021-10-26 PROCEDURE — G8420 CALC BMI NORM PARAMETERS: HCPCS | Performed by: FAMILY MEDICINE

## 2021-10-26 PROCEDURE — 1101F PT FALLS ASSESS-DOCD LE1/YR: CPT | Performed by: FAMILY MEDICINE

## 2021-10-26 PROCEDURE — G8536 NO DOC ELDER MAL SCRN: HCPCS | Performed by: FAMILY MEDICINE

## 2021-10-26 PROCEDURE — G8510 SCR DEP NEG, NO PLAN REQD: HCPCS | Performed by: FAMILY MEDICINE

## 2021-10-26 PROCEDURE — 1090F PRES/ABSN URINE INCON ASSESS: CPT | Performed by: FAMILY MEDICINE

## 2021-10-26 PROCEDURE — G8427 DOCREV CUR MEDS BY ELIG CLIN: HCPCS | Performed by: FAMILY MEDICINE

## 2021-10-26 PROCEDURE — 99214 OFFICE O/P EST MOD 30 MIN: CPT | Performed by: FAMILY MEDICINE

## 2021-10-26 NOTE — PROGRESS NOTES
Uday Winslow is a 80 y.o. female     Chief Complaint   Patient presents with    Hypertension    Foot Pain     right, top of foot bruise     Pain Scale: /10  Pain Location:     1. Have you been to the ER, urgent care clinic since your last visit? Hospitalized since your last visit? No  2. Have you seen or consulted any other health care providers outside of the [facility] since your last visit? Include any pap smears or colon screening. No    HPI:  Hypertension. Blood pressures have been improving. Management at last visit included tried d/c thiazide and replace with norvasc due to AMANDA, but had a lot of edema, so we wound up adding back 12.5mg oretic as 1 dose valsartan HCT 80/12.5 qhs, and con't the plain valsartan 80mg in AM only. Current regimen: ARB, beta-blocker, calcium channel blocker, thiazide Symptoms include mild edema to bilat LE's with a red, tender area to the R instep for past week. Denies CP, palpitations, or syncope. Lab review:   Lab Results   Component Value Date/Time    Sodium 136 09/14/2021 12:15 PM    Potassium 3.8 09/14/2021 12:15 PM    Chloride 103 09/14/2021 12:15 PM    CO2 28 09/14/2021 12:15 PM    Anion gap 5 09/14/2021 12:15 PM    Glucose 94 09/14/2021 12:15 PM    BUN 36 (H) 09/14/2021 12:15 PM    Creatinine 2.03 (H) 09/14/2021 12:15 PM    BUN/Creatinine ratio 18 09/14/2021 12:15 PM    GFR est AA 28 (L) 09/14/2021 12:15 PM    GFR est non-AA 23 (L) 09/14/2021 12:15 PM    Calcium 9.3 09/14/2021 12:15 PM     Hyperlipidemia. On lipitor 80mg. Atran well. No myalgias, arthralgias, unusual weakness. Pretty much in goal on 11/2018 check.   Lab Results   Component Value Date/Time    Cholesterol, total 190 09/14/2021 12:15 PM    HDL Cholesterol 58 09/14/2021 12:15 PM    LDL, calculated 103.6 (H) 09/14/2021 12:15 PM    VLDL, calculated 28.4 09/14/2021 12:15 PM    Triglyceride 142 09/14/2021 12:15 PM    CHOL/HDL Ratio 3.3 09/14/2021 12:15 PM     Lab Results   Component Value Date/Time    ALT (SGPT) 25 09/14/2021 12:15 PM    Alk. phosphatase 72 09/14/2021 12:15 PM    Bilirubin, total 0.5 09/14/2021 12:15 PM     Loose stool  Better with cholestyramine. Comes and goes. Well tolerated, but lately has run out, maybe some issue with the insurance, pt's daughter will check on that. PMH, SH, Medications/Allergies: reviewed, on chart. Current Outpatient Medications   Medication Sig    valsartan (DIOVAN) 80 mg tablet Take 1 Tablet by mouth two (2) times a day. Indications: pressure    cholestyramine-aspartame (QUESTRAN LIGHT) 4 gram packet Take 1 Packet by mouth two (2) times daily as needed (loose stool).  amLODIPine (NORVASC) 5 mg tablet TAKE 1 TABLET EVERY DAY  FOR  PRESSURE  AND  HEART    dorzolamide-timoloL (COSOPT) 22.3-6.8 mg/mL ophthalmic solution Administer 1 Drop to both eyes two (2) times a day.  atorvastatin (LIPITOR) 40 mg tablet TAKE 1 TABLET EVERY DAY FOR CHOLESTEROL, HEART (NEW DOSE)    metoprolol succinate (TOPROL-XL) 50 mg XL tablet TAKE 1 TABLET EVERY DAY    aspirin delayed-release 81 mg tablet Take 1 Tab by mouth daily.  multivit-mineral-iron-lutein (CENTRUM SILVER ULTRA WOMEN'S) tab tablet Take 1 Tab by mouth daily. No current facility-administered medications for this visit.       ROS:  Constitutional: No fever, chills or weight loss  Respiratory: No cough, SOB   CV: No chest pain or Palpitations    Visit Vitals  BP (!) 118/52   Pulse 80   Temp 97.9 °F (36.6 °C) (Temporal)   Resp 16   Ht 5' 5\" (1.651 m)   Wt 115 lb 6.4 oz (52.3 kg)   SpO2 99%   BMI 19.20 kg/m²     Wt Readings from Last 3 Encounters:   10/26/21 115 lb 6.4 oz (52.3 kg)   10/15/21 119 lb 6 oz (54.1 kg)   09/17/21 116 lb 9.6 oz (52.9 kg)   -4#  BP Readings from Last 3 Encounters:   10/26/21 (!) 118/52   10/15/21 (!) 118/50   09/17/21 132/60     Physical Examination: General appearance - alert, well appearing, and in no distress  Mental status - alert, oriented to person, place, and time  Eyes - pupils equal and reactive, extraocular eye movements intact  ENT - bilateral external ears and nose normal. Normal lips  Neck - supple, no significant adenopathy, no thyromegaly or mass  Lymphatics - no palpable lymphadenopathy, no hepatosplenomegaly  Chest - clear to auscultation, no wheezes, rales or rhonchi, symmetric air entry  Heart - normal rate, irregular rhythm, normal S1, S2, no murmurs, rubs, clicks or gallops  Extremities - peripheral pulses normal, trace pedal edema, R>L with a 2.5cm erythematous patch to the R lateral dorsal foot, mildly TTP, without superficial spreading redness, but some dilated superficial veins leading into the area. No clubbing or cyanosis    A/p:  HTN and CKD3. Edema is mild on the valsartan 80/12.5 qhs and plain valsartan 80 qam and 5mg norvasc. Recheck labs now that she's on lower dose thiazide with half pill diovan + norvasc. conisder consolidation to exforge HCT or split off the thiazide to a 12.5mg/d if GFRaa and lytes ok. R instep lesion  ddx includes venous stasis (favor, due to dusky erythema quality) and mild cellulitis. Tx for now with hot compresses, elevation, compression, and boost ASA to 325mg/d. Check xray foot. HLD and ASCVD  Doing well on lipitor 40mg, questran, and ASA 81mg/d. Plan Recheck labs fall 2022, adj PRN. F/U 1wk for foot check/PRN    1. Have you been to the ER, urgent care clinic since your last visit? Hospitalized since your last visit? No    2. Have you seen or consulted any other health care providers outside of the [address] [place] [address] since your last visit? Include any pap smears or colon screening.  No

## 2021-10-26 NOTE — PROGRESS NOTES
Chief Complaint   Patient presents with    Hypertension    Foot Pain     right, top of foot bruise     1. Have you been to the ER, urgent care clinic since your last visit? Hospitalized since your last visit? No    2. Have you seen or consulted any other health care providers outside of the [address] [place] [address] since your last visit? Include any pap smears or colon screening. No    Identified pt with two pt identifiers(name and ). Reviewed record in preparation for visit and have obtained necessary documentation.

## 2021-10-26 NOTE — PATIENT INSTRUCTIONS
Varicose Veins: Care Instructions  Your Care Instructions  Varicose veins are twisted, enlarged veins near the surface of the skin. They develop most often in the legs and ankles. Some people may be more likely than others to get varicose veins because of aging or hormone changes or because a parent has them. Being overweight or pregnant can make varicose veins worse. Jobs that require standing for long periods of time also can make them worse. Follow-up care is a key part of your treatment and safety. Be sure to make and go to all appointments, and call your doctor if you are having problems. It's also a good idea to know your test results and keep a list of the medicines you take. How can you care for yourself at home? · Wear compression stockings during the day to help relieve symptoms. They improve blood flow and are the main treatment for varicose veins. Talk to your doctor about which ones to get and where to get them. · Prop up your legs at or above the level of your heart when possible. This helps keep the blood from pooling in your lower legs and improves blood flow to the rest of your body. · Avoid sitting and standing for long periods. This puts added stress on your veins. · Get regular exercise, and control your weight. Walk, bicycle, or swim to improve blood flow in your legs. · If you bump your leg so hard that you know it is likely to bruise, prop up your leg and put ice or a cold pack on the area for 10 to 20 minutes at a time. Try to do this every 1 to 2 hours for the next 3 days (when you are awake) or until the swelling goes down. Put a thin cloth between the ice and your skin. · If you cut or scratch the skin over a vein, it may bleed a lot. Prop up your leg and apply firm pressure with a clean bandage over the site of the bleeding. Continue to apply pressure for a full 15 minutes. Do not check sooner to see if the bleeding has stopped.  If the bleeding has not stopped after 15 minutes, apply pressure again for another 15 minutes. You can repeat this up to 3 times for a total of 45 minutes. If you have a blood clot in a varicose vein, you may have tenderness and swelling over the vein. The vein may feel firm. Be sure to call your doctor right away if you have these symptoms. If your doctor has told you how to care for the clot, follow his or her instructions. Care may include the following:  · Prop up your leg and apply heat with a warm, damp cloth or a heating pad set on low (put a towel or cloth between your leg and the heating pad to prevent burns). · Ask your doctor if you can take an over-the-counter pain medicine, such as acetaminophen (Tylenol), ibuprofen (Advil, Motrin), or naproxen (Aleve). Be safe with medicines. Read and follow all instructions on the label. When should you call for help? Call 911 anytime you think you may need emergency care. For example, call if:    · You have sudden chest pain and shortness of breath, or you cough up blood. Call your doctor now or seek immediate medical care if:    · You have signs of a blood clot, such as:  ? Pain in your calf, back of the knee, thigh, or groin. ? Redness and swelling in your leg or groin.     · A varicose vein begins to bleed and you cannot stop it.     · You have a tender lump in your leg.     · You get an open sore. Watch closely for changes in your health, and be sure to contact your doctor if:    · Your varicose vein symptoms do not improve with home treatment. Where can you learn more? Go to http://www.gray.com/  Enter B637 in the search box to learn more about \"Varicose Veins: Care Instructions. \"  Current as of: July 6, 2021               Content Version: 13.0  © 2006-2021 [employer]. Care instructions adapted under license by [employer] (which disclaims liability or warranty for this information).  If you have questions about a medical condition or this instruction, always ask your healthcare professional. [place] [address] any warranty or liability for your use of this information.

## 2021-10-27 LAB
ANION GAP SERPL CALC-SCNC: 6 MMOL/L (ref 5–15)
BUN SERPL-MCNC: 27 MG/DL (ref 6–20)
BUN/CREAT SERPL: 15 (ref 12–20)
CALCIUM SERPL-MCNC: 9.3 MG/DL (ref 8.5–10.1)
CHLORIDE SERPL-SCNC: 107 MMOL/L (ref 97–108)
CO2 SERPL-SCNC: 27 MMOL/L (ref 21–32)
CREAT SERPL-MCNC: 1.84 MG/DL (ref 0.55–1.02)
GLUCOSE SERPL-MCNC: 112 MG/DL (ref 65–100)
POTASSIUM SERPL-SCNC: 3.7 MMOL/L (ref 3.5–5.1)
SODIUM SERPL-SCNC: 140 MMOL/L (ref 136–145)

## 2021-11-02 ENCOUNTER — OFFICE VISIT (OUTPATIENT)
Dept: FAMILY MEDICINE CLINIC | Age: 86
End: 2021-11-02
Payer: MEDICARE

## 2021-11-02 VITALS
BODY MASS INDEX: 18.76 KG/M2 | RESPIRATION RATE: 16 BRPM | WEIGHT: 112.6 LBS | TEMPERATURE: 97.8 F | SYSTOLIC BLOOD PRESSURE: 118 MMHG | HEIGHT: 65 IN | OXYGEN SATURATION: 98 % | HEART RATE: 99 BPM | DIASTOLIC BLOOD PRESSURE: 62 MMHG

## 2021-11-02 DIAGNOSIS — I87.8 VENOUS STASIS: Primary | ICD-10-CM

## 2021-11-02 PROCEDURE — G8510 SCR DEP NEG, NO PLAN REQD: HCPCS | Performed by: FAMILY MEDICINE

## 2021-11-02 PROCEDURE — 99212 OFFICE O/P EST SF 10 MIN: CPT | Performed by: FAMILY MEDICINE

## 2021-11-02 PROCEDURE — G8536 NO DOC ELDER MAL SCRN: HCPCS | Performed by: FAMILY MEDICINE

## 2021-11-02 PROCEDURE — G8427 DOCREV CUR MEDS BY ELIG CLIN: HCPCS | Performed by: FAMILY MEDICINE

## 2021-11-02 PROCEDURE — 1101F PT FALLS ASSESS-DOCD LE1/YR: CPT | Performed by: FAMILY MEDICINE

## 2021-11-02 PROCEDURE — 1090F PRES/ABSN URINE INCON ASSESS: CPT | Performed by: FAMILY MEDICINE

## 2021-11-02 PROCEDURE — G8420 CALC BMI NORM PARAMETERS: HCPCS | Performed by: FAMILY MEDICINE

## 2021-11-02 NOTE — PROGRESS NOTES
Willy Romero is a 80 y.o. female     No chief complaint on file. Pain Scale: /10  Pain Location:     1. Have you been to the ER, urgent care clinic since your last visit? Hospitalized since your last visit? No  2. Have you seen or consulted any other health care providers outside of the [address] [place] [address] since your last visit? Include any pap smears or colon screening. No    HPI:  Pt here for quick recheck of R instep lesion. Last visit we gave tentative dx of stasis dermatitis and tx with hot compresses, elevation, compression, and boost ASA to 325mg/d. Since then, sx have been gradually improving, redness is down, TTP is down. PMH, SH, Medications/Allergies: reviewed, on chart. Current Outpatient Medications   Medication Sig    valsartan (DIOVAN) 80 mg tablet Take 1 Tablet by mouth two (2) times a day. Indications: pressure    cholestyramine-aspartame (QUESTRAN LIGHT) 4 gram packet Take 1 Packet by mouth two (2) times daily as needed (loose stool).  amLODIPine (NORVASC) 5 mg tablet TAKE 1 TABLET EVERY DAY  FOR  PRESSURE  AND  HEART    dorzolamide-timoloL (COSOPT) 22.3-6.8 mg/mL ophthalmic solution Administer 1 Drop to both eyes two (2) times a day.  atorvastatin (LIPITOR) 40 mg tablet TAKE 1 TABLET EVERY DAY FOR CHOLESTEROL, HEART (NEW DOSE)    metoprolol succinate (TOPROL-XL) 50 mg XL tablet TAKE 1 TABLET EVERY DAY    aspirin delayed-release 81 mg tablet Take 1 Tab by mouth daily.  multivit-mineral-iron-lutein (CENTRUM SILVER ULTRA WOMEN'S) tab tablet Take 1 Tab by mouth daily. No current facility-administered medications for this visit. ROS:  Constitutional: No fever, chills or weight loss  Respiratory: No cough, SOB   CV: No chest pain or Palpitations    There were no vitals taken for this visit.   Wt Readings from Last 3 Encounters:   10/26/21 115 lb 6.4 oz (52.3 kg)   10/15/21 119 lb 6 oz (54.1 kg)   09/17/21 116 lb 9.6 oz (52.9 kg)   -4#  BP Readings from Last 3 Encounters:   10/26/21 (!) 118/52   10/15/21 (!) 118/50   09/17/21 132/60     Physical Examination: General appearance - alert, well appearing, and in no distress  Mental status - alert, oriented to person, place, and time  Eyes - pupils equal and reactive, extraocular eye movements intact  ENT - bilateral external ears and nose normal. Normal lips  Extremities - peripheral pulses normal, trace pedal edema, R>L with a 3 cm mildly erythematous patch to the R lateral dorsal foot, minimal TTP, without superficial spreading redness, but minimal prominent superficial veins leading into the area. No clubbing or cyanosis    A/p:  R instep lesion  C/w venous stasis (favor, due to dusky erythema quality). Con't with hot compresses, elevation, compression, and boost ASA to 325mg/d. Xray foot benign    F/U 3mo    1. Have you been to the ER, urgent care clinic since your last visit? Hospitalized since your last visit? No    2. Have you seen or consulted any other health care providers outside of the [address] [place] [address] since your last visit? Include any pap smears or colon screening.  No

## 2021-11-02 NOTE — PROGRESS NOTES
Chief Complaint   Patient presents with    Hypertension     1. Have you been to the ER, urgent care clinic since your last visit? Hospitalized since your last visit? No    2. Have you seen or consulted any other health care providers outside of the [address] [place] [address] since your last visit? Include any pap smears or colon screening. No    Identified pt with two pt identifiers(name and ). Reviewed record in preparation for visit and have obtained necessary documentation.     Symptom review:    NO  Fever   NO  Shaking chills  NO  Cough  NO Headaches  NO  Body aches  NO  Coughing up blood  NO  Chest congestion  NO  Chest pain  NO  Shortness of breath  NO  Profound Loss of smell/taste  NO  Nausea/Vomiting   NO  Loose stool/Diarrhea  NO  any skin issues

## 2021-11-09 ENCOUNTER — OFFICE VISIT (OUTPATIENT)
Dept: FAMILY MEDICINE CLINIC | Age: 86
End: 2021-11-09
Payer: MEDICARE

## 2021-11-09 VITALS
OXYGEN SATURATION: 99 % | HEIGHT: 65 IN | RESPIRATION RATE: 16 BRPM | WEIGHT: 115 LBS | TEMPERATURE: 98.1 F | HEART RATE: 68 BPM | BODY MASS INDEX: 19.16 KG/M2 | DIASTOLIC BLOOD PRESSURE: 78 MMHG | SYSTOLIC BLOOD PRESSURE: 122 MMHG

## 2021-11-09 DIAGNOSIS — M79.604 LEG PAIN, RIGHT: ICD-10-CM

## 2021-11-09 DIAGNOSIS — I80.01 THROMBOPHLEBITIS OF SUPERFICIAL VEINS OF RIGHT LOWER EXTREMITY: Primary | ICD-10-CM

## 2021-11-09 PROCEDURE — G8510 SCR DEP NEG, NO PLAN REQD: HCPCS | Performed by: FAMILY MEDICINE

## 2021-11-09 PROCEDURE — 1090F PRES/ABSN URINE INCON ASSESS: CPT | Performed by: FAMILY MEDICINE

## 2021-11-09 PROCEDURE — 1101F PT FALLS ASSESS-DOCD LE1/YR: CPT | Performed by: FAMILY MEDICINE

## 2021-11-09 PROCEDURE — 99213 OFFICE O/P EST LOW 20 MIN: CPT | Performed by: FAMILY MEDICINE

## 2021-11-09 PROCEDURE — G8536 NO DOC ELDER MAL SCRN: HCPCS | Performed by: FAMILY MEDICINE

## 2021-11-09 PROCEDURE — G8427 DOCREV CUR MEDS BY ELIG CLIN: HCPCS | Performed by: FAMILY MEDICINE

## 2021-11-09 PROCEDURE — G8420 CALC BMI NORM PARAMETERS: HCPCS | Performed by: FAMILY MEDICINE

## 2021-11-09 RX ORDER — CEPHALEXIN 500 MG/1
500 CAPSULE ORAL 3 TIMES DAILY
COMMUNITY
Start: 2021-11-08 | End: 2021-11-18

## 2021-11-09 NOTE — PATIENT INSTRUCTIONS
Boost hot compresses to 3x. d x15min ea, con't , elevation, compression, and boost aspirin to 325mg twice daily x7d, then back to 81mg/d.

## 2021-11-09 NOTE — PROGRESS NOTES
Eugenio Irizarry is a 80 y.o. female     Chief Complaint   Patient presents with    Foot Pain     left foot, bruising     Pain Scale: /10  Pain Location:     1. Have you been to the ER, urgent care clinic since your last visit? Hospitalized since your last visit? No  2. Have you seen or consulted any other health care providers outside of the [address] [place] [address] since your last visit? Include any pap smears or colon screening. No    HPI:  Pt here for quick recheck of R instep lesion. Last visit we gave tentative dx of stasis dermatitis and tx with hot compresses, elevation, compression, and boost ASA to 325mg/d. Since then, sx have been gradually improving, redness is down, TTP is down. PMH, SH, Medications/Allergies: reviewed, on chart. Current Outpatient Medications   Medication Sig    cephALEXin (KEFLEX) 500 mg capsule Take 500 mg by mouth three (3) times daily.  valsartan (DIOVAN) 80 mg tablet Take 1 Tablet by mouth two (2) times a day. Indications: pressure    cholestyramine-aspartame (QUESTRAN LIGHT) 4 gram packet Take 1 Packet by mouth two (2) times daily as needed (loose stool).  amLODIPine (NORVASC) 5 mg tablet TAKE 1 TABLET EVERY DAY  FOR  PRESSURE  AND  HEART    dorzolamide-timoloL (COSOPT) 22.3-6.8 mg/mL ophthalmic solution Administer 1 Drop to both eyes two (2) times a day.  atorvastatin (LIPITOR) 40 mg tablet TAKE 1 TABLET EVERY DAY FOR CHOLESTEROL, HEART (NEW DOSE)    metoprolol succinate (TOPROL-XL) 50 mg XL tablet TAKE 1 TABLET EVERY DAY    aspirin delayed-release 81 mg tablet Take 1 Tab by mouth daily.  multivit-mineral-iron-lutein (CENTRUM SILVER ULTRA WOMEN'S) tab tablet Take 1 Tab by mouth daily. No current facility-administered medications for this visit.       ROS:  Constitutional: No fever, chills or weight loss  Respiratory: No cough, SOB   CV: No chest pain or Palpitations    Visit Vitals  /78 (BP 1 Location: Right arm)   Pulse 68   Temp 98.1 °F (36.7 °C) (Temporal)   Resp 16   Ht 5' 5\" (1.651 m)   Wt 115 lb (52.2 kg)   SpO2 99%   BMI 19.14 kg/m²     Wt Readings from Last 3 Encounters:   11/09/21 115 lb (52.2 kg)   11/02/21 112 lb 9.6 oz (51.1 kg)   10/26/21 115 lb 6.4 oz (52.3 kg)   -4#  BP Readings from Last 3 Encounters:   11/09/21 122/78   11/02/21 118/62   10/26/21 (!) 118/52     Physical Examination: General appearance - alert, well appearing, and in no distress  Mental status - alert, oriented to person, place, and time  Eyes - pupils equal and reactive, extraocular eye movements intact  ENT - bilateral external ears and nose normal. Normal lips  Extremities - peripheral pulses normal, trace pedal edema, R>L with a 3 cm mildly erythematous patch to the R lateral dorsal foot, minimal TTP, without superficial spreading redness, but minimal prominent superficial veins leading into the area. No clubbing or cyanosis    Lab Results   Component Value Date/Time    Sodium 140 10/26/2021 03:05 PM    Potassium 3.7 10/26/2021 03:05 PM    Chloride 107 10/26/2021 03:05 PM    CO2 27 10/26/2021 03:05 PM    Anion gap 6 10/26/2021 03:05 PM    Glucose 112 (H) 10/26/2021 03:05 PM    BUN 27 (H) 10/26/2021 03:05 PM    Creatinine 1.84 (H) 10/26/2021 03:05 PM    BUN/Creatinine ratio 15 10/26/2021 03:05 PM    GFR est AA 32 (L) 10/26/2021 03:05 PM    GFR est non-AA 26 (L) 10/26/2021 03:05 PM    Calcium 9.3 10/26/2021 03:05 PM       A/p:  R instep lesion  C/w venous stasis (favor, due to dusky erythema quality), but not improving. Boost hot compresses to TID x15min ea, con't , elevation, compression, and boost ASA to 325mg BID x7d, then back to 81mg/d. Last Xray foot benign, check doppler venogram R leg    F/U 1mo/PRN    1. Have you been to the ER, urgent care clinic since your last visit? Hospitalized since your last visit? No    2. Have you seen or consulted any other health care providers outside of the [address] [place] [address] since your last visit?   Include any pap smears or colon screening. No    Identified pt with two pt identifiers(name and ). Reviewed record in preparation for visit and have obtained necessary documentation.     Symptom review:    NO  Fever   NO  Shaking chills  NO  Cough  NO Headaches  NO  Body aches  NO  Coughing up blood  NO  Chest congestion  NO  Chest pain  NO  Shortness of breath  NO  Profound Loss of smell/taste  NO  Nausea/Vomiting   NO  Loose stool/Diarrhea  NO  any skin issues

## 2021-11-16 ENCOUNTER — HOSPITAL ENCOUNTER (OUTPATIENT)
Dept: ULTRASOUND IMAGING | Age: 86
Discharge: HOME | End: 2021-11-16
Attending: FAMILY MEDICINE
Payer: MEDICARE

## 2021-11-16 DIAGNOSIS — M79.604 LEG PAIN, RIGHT: ICD-10-CM

## 2021-11-16 DIAGNOSIS — I80.01 THROMBOPHLEBITIS OF SUPERFICIAL VEINS OF RIGHT LOWER EXTREMITY: ICD-10-CM

## 2021-11-16 PROCEDURE — 93971 EXTREMITY STUDY: CPT

## 2021-12-17 ENCOUNTER — OFFICE VISIT (OUTPATIENT)
Dept: FAMILY MEDICINE CLINIC | Age: 86
End: 2021-12-17
Payer: MEDICARE

## 2021-12-17 VITALS
WEIGHT: 116 LBS | DIASTOLIC BLOOD PRESSURE: 65 MMHG | BODY MASS INDEX: 19.3 KG/M2 | HEART RATE: 74 BPM | SYSTOLIC BLOOD PRESSURE: 122 MMHG | TEMPERATURE: 97.5 F | OXYGEN SATURATION: 100 % | RESPIRATION RATE: 18 BRPM

## 2021-12-17 DIAGNOSIS — I10 PRIMARY HYPERTENSION: Primary | ICD-10-CM

## 2021-12-17 DIAGNOSIS — I25.10 ASCVD (ARTERIOSCLEROTIC CARDIOVASCULAR DISEASE): ICD-10-CM

## 2021-12-17 DIAGNOSIS — I65.23 BILATERAL CAROTID ARTERY STENOSIS: ICD-10-CM

## 2021-12-17 DIAGNOSIS — N18.30 STAGE 3 CHRONIC KIDNEY DISEASE, UNSPECIFIED WHETHER STAGE 3A OR 3B CKD (HCC): ICD-10-CM

## 2021-12-17 DIAGNOSIS — E78.00 PURE HYPERCHOLESTEROLEMIA: ICD-10-CM

## 2021-12-17 LAB
ANION GAP SERPL CALC-SCNC: 6 MMOL/L (ref 5–15)
BUN SERPL-MCNC: 25 MG/DL (ref 6–20)
BUN/CREAT SERPL: 15 (ref 12–20)
CALCIUM SERPL-MCNC: 9.6 MG/DL (ref 8.5–10.1)
CHLORIDE SERPL-SCNC: 110 MMOL/L (ref 97–108)
CO2 SERPL-SCNC: 25 MMOL/L (ref 21–32)
CREAT SERPL-MCNC: 1.62 MG/DL (ref 0.55–1.02)
GLUCOSE SERPL-MCNC: 99 MG/DL (ref 65–100)
POTASSIUM SERPL-SCNC: 3.8 MMOL/L (ref 3.5–5.1)
SODIUM SERPL-SCNC: 141 MMOL/L (ref 136–145)

## 2021-12-17 PROCEDURE — 99214 OFFICE O/P EST MOD 30 MIN: CPT | Performed by: FAMILY MEDICINE

## 2021-12-17 NOTE — PROGRESS NOTES
Yoana Talavera is a 80 y.o. female     Chief Complaint   Patient presents with    Cholesterol Problem    Hypertension     Pain Scale: /10  Pain Location:     1. Have you been to the ER, urgent care clinic since your last visit? Hospitalized since your last visit? No  2. Have you seen or consulted any other health care providers outside of the [address] [place] [address] since your last visit? Include any pap smears or colon screening. No    HPI:  Hypertension. Blood pressures have been improving. Management at last visit included con't current tx. On low dose thiazide as half of a valsartan HCT 80/12.5 mg in AM only. Current regimen: ARB, beta-blocker, calcium channel blocker, thiazide Symptoms include no sx. The red, tender area to the R instep has resolved. Denies CP, palpitations, or syncope. Lab review:   Lab Results   Component Value Date/Time    Sodium 140 10/26/2021 03:05 PM    Potassium 3.7 10/26/2021 03:05 PM    Chloride 107 10/26/2021 03:05 PM    CO2 27 10/26/2021 03:05 PM    Anion gap 6 10/26/2021 03:05 PM    Glucose 112 (H) 10/26/2021 03:05 PM    BUN 27 (H) 10/26/2021 03:05 PM    Creatinine 1.84 (H) 10/26/2021 03:05 PM    BUN/Creatinine ratio 15 10/26/2021 03:05 PM    GFR est AA 32 (L) 10/26/2021 03:05 PM    GFR est non-AA 26 (L) 10/26/2021 03:05 PM    Calcium 9.3 10/26/2021 03:05 PM     Hyperlipidemia. On lipitor 80mg. Taran well. No myalgias, arthralgias, unusual weakness. Pretty much in goal on 11/2018 check. Lab Results   Component Value Date/Time    Cholesterol, total 190 09/14/2021 12:15 PM    HDL Cholesterol 58 09/14/2021 12:15 PM    LDL, calculated 103.6 (H) 09/14/2021 12:15 PM    VLDL, calculated 28.4 09/14/2021 12:15 PM    Triglyceride 142 09/14/2021 12:15 PM    CHOL/HDL Ratio 3.3 09/14/2021 12:15 PM     Lab Results   Component Value Date/Time    ALT (SGPT) 25 09/14/2021 12:15 PM    Alk.  phosphatase 72 09/14/2021 12:15 PM    Bilirubin, total 0.5 09/14/2021 12:15 PM     Loose stool  Better with cholestyramine. Comes and goes. Well tolerated. Has plenty now. PMH, SH, Medications/Allergies: reviewed, on chart. Current Outpatient Medications   Medication Sig    valsartan (DIOVAN) 80 mg tablet Take 1 Tablet by mouth two (2) times a day. Indications: pressure    cholestyramine-aspartame (QUESTRAN LIGHT) 4 gram packet Take 1 Packet by mouth two (2) times daily as needed (loose stool).  amLODIPine (NORVASC) 5 mg tablet TAKE 1 TABLET EVERY DAY  FOR  PRESSURE  AND  HEART    dorzolamide-timoloL (COSOPT) 22.3-6.8 mg/mL ophthalmic solution Administer 1 Drop to both eyes two (2) times a day.  atorvastatin (LIPITOR) 40 mg tablet TAKE 1 TABLET EVERY DAY FOR CHOLESTEROL, HEART (NEW DOSE)    metoprolol succinate (TOPROL-XL) 50 mg XL tablet TAKE 1 TABLET EVERY DAY    aspirin delayed-release 81 mg tablet Take 1 Tab by mouth daily.  multivit-mineral-iron-lutein (CENTRUM SILVER ULTRA WOMEN'S) tab tablet Take 1 Tab by mouth daily. No current facility-administered medications for this visit.       ROS:  Constitutional: No fever, chills or weight loss  Respiratory: No cough, SOB   CV: No chest pain or Palpitations    Visit Vitals  /65   Pulse 74   Temp 97.5 °F (36.4 °C) (Temporal)   Resp 18   Wt 116 lb (52.6 kg)   SpO2 100%   BMI 19.30 kg/m²     Wt Readings from Last 3 Encounters:   12/17/21 116 lb (52.6 kg)   11/09/21 115 lb (52.2 kg)   11/02/21 112 lb 9.6 oz (51.1 kg)   +1#  BP Readings from Last 3 Encounters:   12/17/21 122/65   11/09/21 122/78   11/02/21 118/62     Physical Examination: General appearance - alert, well appearing, and in no distress  Mental status - alert, oriented to person, place, and time  Eyes - pupils equal and reactive, extraocular eye movements intact  ENT - bilateral external ears and nose normal. Normal lips  Neck - supple, no significant adenopathy, no thyromegaly or mass  Lymphatics - no palpable lymphadenopathy, no hepatosplenomegaly  Chest - clear to auscultation, no wheezes, rales or rhonchi, symmetric air entry  Heart - normal rate, irregular rhythm, normal S1, S2, no murmurs, rubs, clicks or gallops  Extremities - peripheral pulses normal, no pedal edema, R>L with a 2.5cm erythematous patch to the R lateral dorsal foot, NTTP, without superficial spreading redness, but some dilated superficial veins leading into the area. No clubbing or cyanosis    A/p:  HTN and CKD3. Edema is resolved on the 1/2 valsartan HCT 80/12.5 qhs plus plain valsartan 80 qam. Will try DC thiazide again, use the plain diovan + norvasc + toprol XL 50.    R instep lesion  ddx includes venous stasis (favor, due to dusky erythema quality) and mild cellulitis. Tx for now with hot compresses, elevation, compression, and boost ASA to 325mg/d. Check xray foot. HLD and ASCVD  Doing well on lipitor 40mg, questran, and ASA 81mg/d. Plan Recheck labs fall 2022, adj PRN. F/U 3wk for recheck/PRN    1. Have you been to the ER, urgent care clinic since your last visit? Hospitalized since your last visit? No    2. Have you seen or consulted any other health care providers outside of the [address] [place] [address] since your last visit? Include any pap smears or colon screening.  No

## 2022-02-18 ENCOUNTER — OFFICE VISIT (OUTPATIENT)
Dept: FAMILY MEDICINE CLINIC | Age: 87
End: 2022-02-18
Payer: MEDICARE

## 2022-02-18 VITALS
SYSTOLIC BLOOD PRESSURE: 130 MMHG | OXYGEN SATURATION: 97 % | HEIGHT: 65 IN | RESPIRATION RATE: 18 BRPM | WEIGHT: 115.5 LBS | BODY MASS INDEX: 19.24 KG/M2 | HEART RATE: 83 BPM | DIASTOLIC BLOOD PRESSURE: 60 MMHG | TEMPERATURE: 97.8 F

## 2022-02-18 DIAGNOSIS — I25.10 ASCVD (ARTERIOSCLEROTIC CARDIOVASCULAR DISEASE): ICD-10-CM

## 2022-02-18 DIAGNOSIS — I65.23 BILATERAL CAROTID ARTERY STENOSIS: ICD-10-CM

## 2022-02-18 DIAGNOSIS — N18.32 STAGE 3B CHRONIC KIDNEY DISEASE (HCC): ICD-10-CM

## 2022-02-18 DIAGNOSIS — I10 PRIMARY HYPERTENSION: ICD-10-CM

## 2022-02-18 DIAGNOSIS — R35.0 FREQUENCY OF URINATION: Primary | ICD-10-CM

## 2022-02-18 DIAGNOSIS — E78.2 MIXED HYPERLIPIDEMIA: ICD-10-CM

## 2022-02-18 PROBLEM — N18.30 CKD (CHRONIC KIDNEY DISEASE) STAGE 3, GFR 30-59 ML/MIN (HCC): Status: RESOLVED | Noted: 2020-01-06 | Resolved: 2022-02-18

## 2022-02-18 LAB
BILIRUB UR QL STRIP: NEGATIVE
GLUCOSE UR-MCNC: NEGATIVE MG/DL
KETONES P FAST UR STRIP-MCNC: NEGATIVE MG/DL
PH UR STRIP: 5.5 [PH] (ref 4.6–8)
PROT UR QL STRIP: NEGATIVE
SP GR UR STRIP: 1.02 (ref 1–1.03)
UA UROBILINOGEN AMB POC: NORMAL (ref 0.2–1)
URINALYSIS CLARITY POC: NORMAL
URINALYSIS COLOR POC: YELLOW
URINE BLOOD POC: NEGATIVE
URINE LEUKOCYTES POC: NORMAL
URINE NITRITES POC: NEGATIVE

## 2022-02-18 PROCEDURE — 99214 OFFICE O/P EST MOD 30 MIN: CPT | Performed by: FAMILY MEDICINE

## 2022-02-18 PROCEDURE — 81003 URINALYSIS AUTO W/O SCOPE: CPT | Performed by: FAMILY MEDICINE

## 2022-02-18 NOTE — PROGRESS NOTES
Chucho Lobato is a 80 y.o. female     Chief Complaint   Patient presents with    Urinary Frequency     Pain Scale: /10  Pain Location:     1. Have you been to the ER, urgent care clinic since your last visit? Hospitalized since your last visit? No  2. Have you seen or consulted any other health care providers outside of the [address] [place] [address] since your last visit? Include any pap smears or colon screening. No    HPI:  Urinary frequency  Pt reports about 3x/d, 2x/night, with increased urgency, but no hematuria or pyuria. No f/c. No hx of cystocele    Hypertension/ CKD 3b. Blood pressures have been improving. Management at last visit included d/c thiazide due to concerns about dropping GFRaa. Current regimen: ARB, beta-blocker, calcium channel blocker. Symptoms include no sx. The red, tender area to the R instep has resolved. Denies CP, palpitations, or syncope. Lab review:   Lab Results   Component Value Date/Time    Sodium 141 12/17/2021 11:06 AM    Potassium 3.8 12/17/2021 11:06 AM    Chloride 110 (H) 12/17/2021 11:06 AM    CO2 25 12/17/2021 11:06 AM    Anion gap 6 12/17/2021 11:06 AM    Glucose 99 12/17/2021 11:06 AM    BUN 25 (H) 12/17/2021 11:06 AM    Creatinine 1.62 (H) 12/17/2021 11:06 AM    BUN/Creatinine ratio 15 12/17/2021 11:06 AM    GFR est AA 37 (L) 12/17/2021 11:06 AM    GFR est non-AA 30 (L) 12/17/2021 11:06 AM    Calcium 9.6 12/17/2021 11:06 AM     Hyperlipidemia. On lipitor 80mg. Taran well. No myalgias, arthralgias, unusual weakness. Pretty much in goal on 11/2018 check. Lab Results   Component Value Date/Time    Cholesterol, total 190 09/14/2021 12:15 PM    HDL Cholesterol 58 09/14/2021 12:15 PM    LDL, calculated 103.6 (H) 09/14/2021 12:15 PM    VLDL, calculated 28.4 09/14/2021 12:15 PM    Triglyceride 142 09/14/2021 12:15 PM    CHOL/HDL Ratio 3.3 09/14/2021 12:15 PM     Lab Results   Component Value Date/Time    ALT (SGPT) 25 09/14/2021 12:15 PM    Alk.  phosphatase 72 09/14/2021 12:15 PM    Bilirubin, total 0.5 09/14/2021 12:15 PM     PMH, SH, Medications/Allergies: reviewed, on chart. Current Outpatient Medications   Medication Sig    metoprolol succinate (TOPROL-XL) 50 mg XL tablet TAKE 1 TABLET EVERY DAY    valsartan (DIOVAN) 80 mg tablet Take 1 Tablet by mouth two (2) times a day. Indications: pressure    cholestyramine-aspartame (QUESTRAN LIGHT) 4 gram packet Take 1 Packet by mouth two (2) times daily as needed (loose stool).  amLODIPine (NORVASC) 5 mg tablet TAKE 1 TABLET EVERY DAY  FOR  PRESSURE  AND  HEART    dorzolamide-timoloL (COSOPT) 22.3-6.8 mg/mL ophthalmic solution Administer 1 Drop to both eyes two (2) times a day.  atorvastatin (LIPITOR) 40 mg tablet TAKE 1 TABLET EVERY DAY FOR CHOLESTEROL, HEART (NEW DOSE)    aspirin delayed-release 81 mg tablet Take 1 Tab by mouth daily.  multivit-mineral-iron-lutein (CENTRUM SILVER ULTRA WOMEN'S) tab tablet Take 1 Tab by mouth daily. No current facility-administered medications for this visit.       ROS:  Constitutional: No fever, chills or weight loss  Respiratory: No cough, SOB   CV: No chest pain or Palpitations    Visit Vitals  /60 (BP 1 Location: Left arm)   Pulse 83   Temp 97.8 °F (36.6 °C) (Temporal)   Resp 18   Ht 5' 5\" (1.651 m)   Wt 115 lb 8 oz (52.4 kg)   SpO2 97%   BMI 19.22 kg/m²     Wt Readings from Last 3 Encounters:   02/18/22 115 lb 8 oz (52.4 kg)   12/17/21 116 lb (52.6 kg)   11/09/21 115 lb (52.2 kg)   -1#  BP Readings from Last 3 Encounters:   02/18/22 130/60   12/17/21 122/65   11/09/21 122/78     Physical Examination: General appearance - alert, well appearing, and in no distress  Mental status - alert, oriented to person, place, and time  Eyes - pupils equal and reactive, extraocular eye movements intact  ENT - bilateral external ears and nose normal. Normal lips  Neck - supple, no significant adenopathy, no thyromegaly or mass  Lymphatics - no palpable lymphadenopathy, no hepatosplenomegaly  Chest - clear to auscultation, no wheezes, rales or rhonchi, symmetric air entry  Heart - normal rate, irregular rhythm, normal S1, S2, no murmurs, rubs, clicks or gallops  Extremities - peripheral pulses normal, no pedal edema, healed lesion to ankle    Results for orders placed or performed in visit on 02/18/22   AMB POC URINALYSIS DIP STICK AUTO W/O MICRO   Result Value Ref Range    Color (UA POC) Yellow     Clarity (UA POC) Cloudy     Glucose (UA POC) Negative Negative    Bilirubin (UA POC) Negative Negative    Ketones (UA POC) Negative Negative    Specific gravity (UA POC) 1.025 1.001 - 1.035    Blood (UA POC) Negative Negative    pH (UA POC) 5.5 4.6 - 8.0    Protein (UA POC) Negative Negative    Urobilinogen (UA POC) 0.2 mg/dL 0.2 - 1    Nitrites (UA POC) Negative Negative    Leukocyte esterase (UA POC) Trace Negative       A/p:  HTN and CKD3. Edema remains in remission. Con't plain diovan + norvasc + toprol XL 50. Urinary urgency  Mild. UA is benign. Work on [employer], but if not improving, rec she see her GYN Dr. Jung Torres of[facility]    HLD and ASCVD  Doing well on lipitor 40mg, questran, and ASA 81mg/d. Plan Recheck labs fall 2022, adj PRN. F/U 2mo for recheck/PRN    1. Have you been to the ER, urgent care clinic since your last visit? Hospitalized since your last visit? No    2. Have you seen or consulted any other health care providers outside of the [address] [place] [address] since your last visit? Include any pap smears or colon screening.  No

## 2022-02-18 NOTE — PATIENT INSTRUCTIONS
Kegel Exercises: Care Instructions  Overview     Kegel exercises strengthen muscles around the bladder. These muscles control the flow of urine. Kegel exercises are sometime called \"pelvic floor\" exercises. They can help prevent urine leakage and keep the pelvic organs in place. Kegel exercises can strengthen pelvic muscles that have been weakened by age, pregnancy, childbirth, and surgery. They may help prevent or treat urine leakage. You do Kegel exercises by tightening the muscles you use when you urinate. You will likely need to do these exercises for several weeks to get better. Follow-up care is a key part of your treatment and safety. Be sure to make and go to all appointments, and call your doctor if you are having problems. It's also a good idea to know your test results and keep a list of the medicines you take. How can you care for yourself at home? · Do Kegel exercises. ? Find the muscles you need to strengthen. To do this, tighten the muscles that stop your urine while you are going to the bathroom. These are the same muscles you squeeze during Kegel exercises. ? Squeeze the muscles as hard as you can. Your belly and thighs should not move. ? Hold the squeeze for 3 seconds. Then relax for 3 seconds. ? Start with 3 seconds, and then add 1 second each week until you are able to squeeze for 10 seconds. ? Repeat the exercise 10 to 15 times for each session. Do three or more sessions each day. · You can check to see if you are using the right muscles. ? Tighten the muscles that help you stop passing gas or keep you from urinating. Make sure you aren't using your stomach, leg, or buttock muscles. ? Place a finger in your vagina and squeeze around it. You are doing them right when you feel pressure around your finger. Your doctor may also suggest that you put special weights in your vagina while you do the exercises.   · Check with your doctor if you don't notice a difference after trying these exercises for several weeks. Your doctor may suggest getting help from a physical therapist or recommend other treatment. Where can you learn more? Go to http://www.gray.com/  Enter J291 in the search box to learn more about \"Kegel Exercises: Care Instructions. \"  Current as of: February 10, 2021               Content Version: 13.0  © 7963-8867 [employer]. Care instructions adapted under license by [employer] (which disclaims liability or warranty for this information). If you have questions about a medical condition or this instruction, always ask your healthcare professional. [address] any warranty or liability for your use of this information.

## 2022-03-18 PROBLEM — I25.2 HISTORY OF MYOCARDIAL INFARCT AT AGE GREATER THAN 60 YEARS: Status: ACTIVE | Noted: 2017-02-17

## 2022-03-22 DIAGNOSIS — I10 BENIGN ESSENTIAL HTN: ICD-10-CM

## 2022-03-22 RX ORDER — AMLODIPINE BESYLATE 5 MG/1
TABLET ORAL
Qty: 90 TABLET | Refills: 3 | Status: SHIPPED | OUTPATIENT
Start: 2022-03-22

## 2022-04-20 ENCOUNTER — OFFICE VISIT (OUTPATIENT)
Dept: FAMILY MEDICINE CLINIC | Age: 87
End: 2022-04-20
Payer: MEDICARE

## 2022-04-20 VITALS
BODY MASS INDEX: 19.87 KG/M2 | SYSTOLIC BLOOD PRESSURE: 118 MMHG | TEMPERATURE: 98.6 F | DIASTOLIC BLOOD PRESSURE: 60 MMHG | WEIGHT: 119.25 LBS | HEART RATE: 89 BPM | OXYGEN SATURATION: 99 % | HEIGHT: 65 IN | RESPIRATION RATE: 18 BRPM

## 2022-04-20 DIAGNOSIS — E78.2 MIXED HYPERLIPIDEMIA: ICD-10-CM

## 2022-04-20 DIAGNOSIS — N30.90 CYSTITIS: Primary | ICD-10-CM

## 2022-04-20 DIAGNOSIS — R19.5 LOOSE STOOLS: ICD-10-CM

## 2022-04-20 DIAGNOSIS — N18.30 STAGE 3 CHRONIC KIDNEY DISEASE, UNSPECIFIED WHETHER STAGE 3A OR 3B CKD (HCC): ICD-10-CM

## 2022-04-20 DIAGNOSIS — R35.0 FREQUENCY OF URINATION: ICD-10-CM

## 2022-04-20 DIAGNOSIS — I10 ESSENTIAL HYPERTENSION: ICD-10-CM

## 2022-04-20 LAB
BILIRUB UR QL STRIP: NEGATIVE
GLUCOSE UR-MCNC: NEGATIVE MG/DL
KETONES P FAST UR STRIP-MCNC: NEGATIVE MG/DL
PH UR STRIP: 5.5 [PH] (ref 4.6–8)
PROT UR QL STRIP: NORMAL
SP GR UR STRIP: 1.02 (ref 1–1.03)
UA UROBILINOGEN AMB POC: NORMAL (ref 0.2–1)
URINALYSIS CLARITY POC: NORMAL
URINALYSIS COLOR POC: YELLOW
URINE BLOOD POC: NORMAL
URINE LEUKOCYTES POC: NORMAL
URINE NITRITES POC: NEGATIVE

## 2022-04-20 PROCEDURE — 99214 OFFICE O/P EST MOD 30 MIN: CPT | Performed by: FAMILY MEDICINE

## 2022-04-20 PROCEDURE — 81002 URINALYSIS NONAUTO W/O SCOPE: CPT | Performed by: FAMILY MEDICINE

## 2022-04-20 RX ORDER — CEPHALEXIN 500 MG/1
500 CAPSULE ORAL 3 TIMES DAILY
Qty: 21 CAPSULE | Refills: 0 | Status: SHIPPED | OUTPATIENT
Start: 2022-04-20 | End: 2022-04-27

## 2022-04-20 RX ORDER — VALSARTAN 80 MG/1
80 TABLET ORAL 2 TIMES DAILY
Qty: 180 TABLET | Refills: 3 | Status: SHIPPED | OUTPATIENT
Start: 2022-04-20 | End: 2022-06-29 | Stop reason: SDUPTHER

## 2022-04-20 RX ORDER — CHOLESTYRAMINE 4 G/9G
1 POWDER, FOR SUSPENSION ORAL
Qty: 30 PACKET | Refills: 5 | Status: SHIPPED | OUTPATIENT
Start: 2022-04-20 | End: 2022-06-29 | Stop reason: SDUPTHER

## 2022-04-20 NOTE — PATIENT INSTRUCTIONS
Urinary Tract Infection (UTI) in Women: Care Instructions  Overview     A urinary tract infection, or UTI, is a general term for an infection anywhere between the kidneys and the urethra (where urine comes out). Most UTIs are bladder infections. They often cause pain or burning when you urinate. UTIs are caused by bacteria and can be cured with antibiotics. Be sure to complete your treatment so that the infection does not get worse. Follow-up care is a key part of your treatment and safety. Be sure to make and go to all appointments, and call your doctor if you are having problems. It's also a good idea to know your test results and keep a list of the medicines you take. How can you care for yourself at home? · Take your antibiotics as directed. Do not stop taking them just because you feel better. You need to take the full course of antibiotics. · Drink extra water and other fluids for the next day or two. This will help make the urine less concentrated and help wash out the bacteria that are causing the infection. (If you have kidney, heart, or liver disease and have to limit fluids, talk with your doctor before you increase the amount of fluids you drink.)  · Avoid drinks that are carbonated or have caffeine. They can irritate the bladder. · Urinate often. Try to empty your bladder each time. · To relieve pain, take a hot bath or lay a heating pad set on low over your lower belly or genital area. Never go to sleep with a heating pad in place. To prevent UTIs  · Drink plenty of water each day. This helps you urinate often, which clears bacteria from your system. (If you have kidney, heart, or liver disease and have to limit fluids, talk with your doctor before you increase the amount of fluids you drink.)  · Urinate when you need to. · If you are sexually active, urinate right after you have sex. · Change sanitary pads often.   · Avoid douches, bubble baths, feminine hygiene sprays, and other feminine hygiene products that have deodorants. · After going to the bathroom, wipe from front to back. When should you call for help? Call your doctor now or seek immediate medical care if:    · Symptoms such as fever, chills, nausea, or vomiting get worse or appear for the first time.     · You have new pain in your back just below your rib cage. This is called flank pain.     · There is new blood or pus in your urine.     · You have any problems with your antibiotic medicine. Watch closely for changes in your health, and be sure to contact your doctor if:    · You are not getting better after taking an antibiotic for 2 days.     · Your symptoms go away but then come back. Where can you learn more? Go to http://www.gray.com/  Enter P934 in the search box to learn more about \"Urinary Tract Infection (UTI) in Women: Care Instructions. \"  Current as of: October 18, 2021               Content Version: 13.2  © 2006-2022 [employer]. Care instructions adapted under license by [employer] (which disclaims liability or warranty for this information). If you have questions about a medical condition or this instruction, always ask your healthcare professional. [place] [address] any warranty or liability for your use of this information.

## 2022-04-20 NOTE — PROGRESS NOTES
Visit Vitals  /60 (BP 1 Location: Left arm)   Pulse 89   Temp 98.6 °F (37 °C) (Temporal)   Resp 18   Ht 5' 5\" (1.651 m)   Wt 119 lb 4 oz (54.1 kg)   SpO2 99%   BMI 19.84 kg/m²     Chief Complaint   Patient presents with    Urinary Frequency     1. Have you been to the ER, urgent care clinic since your last visit? Hospitalized since your last visit? No    2. Have you seen or consulted any other health care providers outside of the [address] [place] [address] since your last visit? Include any pap smears or colon screening.  No

## 2022-04-20 NOTE — PROGRESS NOTES
Romeo Renee is a 80 y.o. female     Chief Complaint   Patient presents with    Urinary Frequency       HPI:  Urinary frequency  Pt reports about 3x/d, 3-4x/night, with increased urgency and burning. No hematuria or pyuria. No f/c. No hx of cystocele but has been a while since last exam.    Hypertension/ CKD 3b. Blood pressures have been improving. Management at last visit included d/c thiazide due to concerns about dropping GFRaa. Current regimen: ARB, beta-blocker, calcium channel blocker. Symptoms include no sx. The red, tender area to the R instep has resolved. Denies CP, palpitations, or syncope. Lab review:   Lab Results   Component Value Date/Time    Sodium 141 12/17/2021 11:06 AM    Potassium 3.8 12/17/2021 11:06 AM    Chloride 110 (H) 12/17/2021 11:06 AM    CO2 25 12/17/2021 11:06 AM    Anion gap 6 12/17/2021 11:06 AM    Glucose 99 12/17/2021 11:06 AM    BUN 25 (H) 12/17/2021 11:06 AM    Creatinine 1.62 (H) 12/17/2021 11:06 AM    BUN/Creatinine ratio 15 12/17/2021 11:06 AM    GFR est AA 37 (L) 12/17/2021 11:06 AM    GFR est non-AA 30 (L) 12/17/2021 11:06 AM    Calcium 9.6 12/17/2021 11:06 AM     Hyperlipidemia. On lipitor 80mg. Taran well. No myalgias, arthralgias, unusual weakness. Pretty much in goal on 11/2018 check. Lab Results   Component Value Date/Time    Cholesterol, total 190 09/14/2021 12:15 PM    HDL Cholesterol 58 09/14/2021 12:15 PM    LDL, calculated 103.6 (H) 09/14/2021 12:15 PM    VLDL, calculated 28.4 09/14/2021 12:15 PM    Triglyceride 142 09/14/2021 12:15 PM    CHOL/HDL Ratio 3.3 09/14/2021 12:15 PM     Lab Results   Component Value Date/Time    ALT (SGPT) 25 09/14/2021 12:15 PM    Alk. phosphatase 72 09/14/2021 12:15 PM    Bilirubin, total 0.5 09/14/2021 12:15 PM     PM, , Medications/Allergies: reviewed, on chart.   Current Outpatient Medications   Medication Sig    atorvastatin (LIPITOR) 40 mg tablet TAKE 1 TABLET EVERY DAY FOR CHOLESTEROL, HEART (NEW DOSE)    amLODIPine (NORVASC) 5 mg tablet TAKE 1 TABLET EVERY DAY  FOR  PRESSURE  AND  HEART    metoprolol succinate (TOPROL-XL) 50 mg XL tablet TAKE 1 TABLET EVERY DAY    valsartan (DIOVAN) 80 mg tablet Take 1 Tablet by mouth two (2) times a day. Indications: pressure    cholestyramine-aspartame (QUESTRAN LIGHT) 4 gram packet Take 1 Packet by mouth two (2) times daily as needed (loose stool).  dorzolamide-timoloL (COSOPT) 22.3-6.8 mg/mL ophthalmic solution Administer 1 Drop to both eyes two (2) times a day.  aspirin delayed-release 81 mg tablet Take 1 Tab by mouth daily.  multivit-mineral-iron-lutein (CENTRUM SILVER ULTRA WOMEN'S) tab tablet Take 1 Tab by mouth daily. No current facility-administered medications for this visit. ROS:  Constitutional: No fever, chills or weight loss  Respiratory: No cough, SOB   CV: No chest pain or Palpitations    Visit Vitals  /60 (BP 1 Location: Left arm)   Pulse 89   Temp 98.6 °F (37 °C) (Temporal)   Resp 18   Ht 5' 5\" (1.651 m)   Wt 119 lb 4 oz (54.1 kg)   SpO2 99%   BMI 19.84 kg/m²     Wt Readings from Last 3 Encounters:   04/20/22 119 lb 4 oz (54.1 kg)   02/18/22 115 lb 8 oz (52.4 kg)   12/17/21 116 lb (52.6 kg)   -1#  BP Readings from Last 3 Encounters:   04/20/22 118/60   02/18/22 130/60   12/17/21 122/65     Physical Examination: General appearance - alert, well appearing, and in no distress  Mental status - alert, oriented to person, place, and time  Eyes - pupils equal and reactive, extraocular eye movements intact  ENT - bilateral external ears and nose normal. Normal lips  Neck - supple, no significant adenopathy, no thyromegaly or mass  Lymphatics - no palpable lymphadenopathy, no hepatosplenomegaly  Chest - clear to auscultation, no wheezes, rales or rhonchi, symmetric air entry  Heart - normal rate, irregular rhythm, normal S1, S2, no murmurs, rubs, clicks or gallops  Abd - NABS. SNT, no HSM/Mass.   Extremities - peripheral pulses normal, no pedal edema, healed lesion to ankle    Results for orders placed or performed in visit on 04/20/22   AMB POC URINALYSIS DIP STICK MANUAL W/O MICRO   Result Value Ref Range    Color (UA POC) Yellow     Clarity (UA POC) Cloudy     Glucose (UA POC) Negative Negative    Bilirubin (UA POC) Negative Negative    Ketones (UA POC) Negative Negative    Specific gravity (UA POC) 1.020 1.001 - 1.035    Blood (UA POC) 3+ Negative    pH (UA POC) 5.5 4.6 - 8.0    Protein (UA POC) 3+ Negative    Urobilinogen (UA POC) 0.2 mg/dL 0.2 - 1    Nitrites (UA POC) Negative Negative    Leukocyte esterase (UA POC) 2+ Negative       A/p:  Urinary urgency  Mild. UA is c/w UTI. Tx with keflex 500mg TID x5d. Rec she see her GYN Dr. Greta Jaffe [facility] to check for cystocele. HTN and CKD3. Edema remains in remission. Con't plain diovan + norvasc + toprol XL 50. HLD and ASCVD  Doing well on lipitor 40mg, questran, and ASA 81mg/d. Plan Recheck labs fall 2022, adj PRN. HLD and loose stools  Doing well on questran and lipitor, but ins not covering questran light, change to regular questran powder, using about 10 packs/mo, so 30 should last ~3mo. Monitor. F/U 2mo for recheck/PRN    1. Have you been to the ER, urgent care clinic since your last visit? Hospitalized since your last visit? No    2. Have you seen or consulted any other health care providers outside of the [address] [place] [address] since your last visit? Include any pap smears or colon screening.  No

## 2022-05-10 ENCOUNTER — HOSPITAL ENCOUNTER (OUTPATIENT)
Dept: LAB | Age: 87
Discharge: HOME | End: 2022-05-10
Payer: MEDICARE

## 2022-05-10 PROCEDURE — 87086 URINE CULTURE/COLONY COUNT: CPT

## 2022-05-11 LAB
BACTERIA SPEC CULT: NORMAL
CC UR VC: NORMAL
SERVICE CMNT-IMP: NORMAL

## 2022-05-17 ENCOUNTER — HOSPITAL ENCOUNTER (OUTPATIENT)
Dept: ULTRASOUND IMAGING | Age: 87
Discharge: HOME | End: 2022-05-17
Attending: UROLOGY
Payer: MEDICARE

## 2022-05-17 DIAGNOSIS — R33.9 ACUTE ON CHRONIC URINARY RETENTION: ICD-10-CM

## 2022-05-17 PROCEDURE — 51798 US URINE CAPACITY MEASURE: CPT

## 2022-06-29 ENCOUNTER — OFFICE VISIT (OUTPATIENT)
Dept: FAMILY MEDICINE CLINIC | Age: 87
End: 2022-06-29
Payer: MEDICARE

## 2022-06-29 VITALS
SYSTOLIC BLOOD PRESSURE: 138 MMHG | HEART RATE: 93 BPM | OXYGEN SATURATION: 99 % | WEIGHT: 116 LBS | TEMPERATURE: 97.8 F | HEIGHT: 65 IN | DIASTOLIC BLOOD PRESSURE: 60 MMHG | BODY MASS INDEX: 19.33 KG/M2 | RESPIRATION RATE: 16 BRPM

## 2022-06-29 DIAGNOSIS — R30.0 DYSURIA: ICD-10-CM

## 2022-06-29 DIAGNOSIS — Z13.31 SCREENING FOR DEPRESSION: ICD-10-CM

## 2022-06-29 DIAGNOSIS — Z13.39 SCREENING FOR ALCOHOLISM: ICD-10-CM

## 2022-06-29 DIAGNOSIS — E78.2 MIXED HYPERLIPIDEMIA: ICD-10-CM

## 2022-06-29 DIAGNOSIS — N18.30 STAGE 3 CHRONIC KIDNEY DISEASE, UNSPECIFIED WHETHER STAGE 3A OR 3B CKD (HCC): ICD-10-CM

## 2022-06-29 DIAGNOSIS — R19.5 LOOSE STOOLS: ICD-10-CM

## 2022-06-29 DIAGNOSIS — I10 ESSENTIAL HYPERTENSION: ICD-10-CM

## 2022-06-29 DIAGNOSIS — N32.0 SYMPTOM OF BLADDER OUTLET OBSTRUCTION: ICD-10-CM

## 2022-06-29 DIAGNOSIS — Z00.00 MEDICARE ANNUAL WELLNESS VISIT, SUBSEQUENT: Primary | ICD-10-CM

## 2022-06-29 PROCEDURE — G0439 PPPS, SUBSEQ VISIT: HCPCS | Performed by: FAMILY MEDICINE

## 2022-06-29 PROCEDURE — 99214 OFFICE O/P EST MOD 30 MIN: CPT | Performed by: FAMILY MEDICINE

## 2022-06-29 RX ORDER — CHOLESTYRAMINE 4 G/9G
1 POWDER, FOR SUSPENSION ORAL
Qty: 30 PACKET | Refills: 5 | Status: SHIPPED | OUTPATIENT
Start: 2022-06-29 | End: 2022-09-30 | Stop reason: ALTCHOICE

## 2022-06-29 RX ORDER — VALSARTAN 80 MG/1
80 TABLET ORAL 2 TIMES DAILY
Qty: 180 TABLET | Refills: 3 | Status: SHIPPED | OUTPATIENT
Start: 2022-06-29

## 2022-06-29 NOTE — PATIENT INSTRUCTIONS
Medicare Wellness Visit    The best way to live healthy is to have a lifestyle where you eat a well-balanced diet, exercise regularly, limit alcohol use, and quit all forms of tobacco/nicotine, if applicable. Regular preventive services are another way to keep healthy. Preventive services (vaccines, screening tests, monitoring & exams) can help personalize your care plan, which helps you manage your own care. Screening tests can find health problems at the earliest stages, when they are easiest to treat. 50[facility] follows the current, evidence-based guidelines published by the [facility] Barahona ([facility]) when recommending preventive services for our patients. Because we follow these guidelines, sometimes recommendations change over time as research supports it. (For example, a prostate screening blood test is no longer routinely recommended for men with no symptoms.)  Of course, you and your doctor may decide to screen more often for some diseases, based on your risk and co-morbidities (chronic disease you are already diagnosed with). Preventive services for you include:  - Medicare offers their members a free annual wellness visit, which is time for you and your primary care provider to discuss and plan for your preventive service needs. Take advantage of this benefit every year!

## 2022-06-29 NOTE — PROGRESS NOTES
Lito Jackson is a 80 y.o. female presenting for/with:    Chief Complaint   Patient presents with    Annual Wellness Visit    Hypertension       Visit Vitals  /60 (BP 1 Location: Left upper arm, BP Patient Position: Sitting, BP Cuff Size: Adult long)   Pulse 93   Temp 97.8 °F (36.6 °C) (Temporal)   Resp 16   Ht 5' 5\" (1.651 m)   Wt 116 lb (52.6 kg)   SpO2 99%   BMI 19.30 kg/m²     Pain Scale: 0 - No pain/10  Pain Location:     1. \"Have you been to the ER, urgent care clinic since your last visit? Hospitalized since your last visit? \" No    2. \"Have you seen or consulted any other health care providers outside of the [address] [place] [address] since your last visit? \" No     3. For patients aged 39-70: Has the patient had a colonoscopy / FIT/ Cologuard? NA - based on age      If the patient is female:    4. For patients aged 41-77: Has the patient had a mammogram within the past 2 years? NA - based on age or sex      11. For patients aged 21-65: Has the patient had a pap smear?  NA - based on age or sex      Symptom review:  NO  Fever   NO  Shaking chills  NO  Cough  NO  Body aches  NO  Coughing up blood  NO  Chest congestion  NO  Chest pain  NO  Shortness of breath  NO  Profound Loss of smell/taste  NO  Nausea/Vomiting   NO  Loose stool/Diarrhea  NO  any skin issues    Patient Risk Factors Reviewed as follows:  NO  have you been in Close contact with confirmed COVID19 patient   NO  History of recent travel to affected geographical areas within the past 14 days  NO  COPD  NO  Active Cancer/Leukemia/Lymphoma/Chemotherapy  NO  Oral steroid use  NO  Pregnant  NO  Diabetes Mellitus  YES  Heart disease  NO  Asthma  NO Health care worker at home  NO Health care worker  NO Is there a Pregnant Woman in the home  NO Dialysis pt in the home   NO   a large number of people living in the home    Learning Assessment 6/29/2022   PRIMARY LEARNER Patient   PRIMARY LANGUAGE ENGLISH   LEARNER PREFERENCE PRIMARY DEMONSTRATION ANSWERED BY self   RELATIONSHIP SELF     Fall Risk Assessment, last 12 mths 6/29/2022   Able to walk? Yes   Fall in past 12 months? 0   Do you feel unsteady? 0   Are you worried about falling 0   Is TUG test greater than 12 seconds? -   Is the gait abnormal? -   Number of falls in past 12 months -   Fall with injury? -       3 most recent PHQ Screens 6/29/2022   PHQ Not Done -   Little interest or pleasure in doing things Not at all   Feeling down, depressed, irritable, or hopeless Not at all   Total Score PHQ 2 0     Abuse Screening Questionnaire 6/29/2022   Do you ever feel afraid of your partner? N   Are you in a relationship with someone who physically or mentally threatens you? N   Is it safe for you to go home? Y       ADL Assessment 6/29/2022   Feeding yourself No Help Needed   Getting from bed to chair No Help Needed   Getting dressed No Help Needed   Bathing or showering No Help Needed   Walk across the room (includes cane/walker) No Help Needed   Using the telphone No Help Needed   Taking your medications No Help Needed   Preparing meals No Help Needed   Managing money (expenses/bills) No Help Needed   Moderately strenuous housework (laundry) No Help Needed   Shopping for personal items (toiletries/medicines) No Help Needed   Shopping for groceries No Help Needed   Driving No Help Needed   Climbing a flight of stairs No Help Needed   Getting to places beyond walking distances No Help Needed      Advance Care Planning 4/20/2022   Patient's Healthcare Decision Maker is: Legal Next of Kin   Confirm Advance Directive None   Patient Would Like to Complete Advance Directive No        This is the Subsequent Medicare Annual Wellness Exam, performed 12 months or more after the Initial AWV or the last Subsequent AWV    I have reviewed the patient's medical history in detail and updated the computerized patient record.        Assessment/Plan   Education and counseling provided:  Are appropriate based on today's review and evaluation    1. Medicare annual wellness visit, subsequent  2. Dysuria  -     URINALYSIS W/ RFLX MICROSCOPIC; Future  -     CULTURE, URINE; Future  -     REFERRAL TO OBSTETRICS AND GYNECOLOGY  3. Symptom of bladder outlet obstruction  4. Stage 3 chronic kidney disease, unspecified whether stage 3a or 3b CKD (HCC)  -     valsartan (DIOVAN) 80 mg tablet; Take 1 Tablet by mouth two (2) times a day. Indications: pressure, Normal, Disp-180 Tablet, R-3  5. Essential hypertension  -     valsartan (DIOVAN) 80 mg tablet; Take 1 Tablet by mouth two (2) times a day. Indications: pressure, Normal, Disp-180 Tablet, R-3  6. Loose stools  -     cholestyramine (QUESTRAN) 4 gram packet; Take 1 Packet by mouth daily as needed (loose stool). , Normal, Disp-30 Packet, R-5Replaces questran light  7. Mixed hyperlipidemia  -     cholestyramine (QUESTRAN) 4 gram packet; Take 1 Packet by mouth daily as needed (loose stool). , Normal, Disp-30 Packet, R-5Replaces questran light  8. Screening for alcoholism  9. Screening for depression  -     DEPRESSION SCREEN ANNUAL       Depression Risk Factor Screening     3 most recent PHQ Screens 6/29/2022   PHQ Not Done -   Little interest or pleasure in doing things Not at all   Feeling down, depressed, irritable, or hopeless Not at all   Total Score PHQ 2 0       Alcohol & Drug Abuse Risk Screen    Do you average more than 1 drink per night or more than 7 drinks a week:  No    On any one occasion in the past three months have you have had more than 3 drinks containing alcohol:  No          Functional Ability and Level of Safety    Hearing: Hearing is good. Activities of Daily Living: The home contains: no safety equipment. Patient does total self care      Ambulation: with no difficulty     Fall Risk:  Fall Risk Assessment, last 12 mths 6/29/2022   Able to walk? Yes   Fall in past 12 months? 0   Do you feel unsteady?  0   Are you worried about falling 0   Is TUG test greater than 12 seconds? -   Is the gait abnormal? -   Number of falls in past 12 months -   Fall with injury? -      Abuse Screen:  Patient is not abused       Cognitive Screening    Has your family/caregiver stated any concerns about your memory: no         Health Maintenance Due     Health Maintenance Due   Topic Date Due    COVID-19 Vaccine (2 - Booster for Sherri series) 06/05/2021       Patient Care Team   Patient Care Team:  Daniel Rogers MD as PCP - General (Family Medicine)  Daniel Rogers MD as PCP - [address] [place], [place] [address]    History     Patient Active Problem List   Diagnosis Code    Spinal stenosis of lumbar region with radiculopathy M48.061, M54.16    TB lung, latent Z22.7    GERD (gastroesophageal reflux disease) K21.9    Carotid stenosis I65.29    Mixed hyperlipidemia E78.2    ASCVD (arteriosclerotic cardiovascular disease) I25.10    Primary hypertension I10    BPPV (benign paroxysmal positional vertigo) H81.10    History of myocardial infarct at age greater than 61 years I25.2     Past Medical History:   Diagnosis Date    ASCVD (arteriosclerotic cardiovascular disease)     MI 80    Back pain     GERD (gastroesophageal reflux disease)     Hypercholesterolemia     Neuropathy     PPD positive     treated 1970,s INH      Past Surgical History:   Procedure Laterality Date    HX COLONOSCOPY      approx 4 years ago, in [facility]     Current Outpatient Medications   Medication Sig Dispense Refill    valsartan (DIOVAN) 80 mg tablet Take 1 Tablet by mouth two (2) times a day. Indications: pressure 180 Tablet 3    cholestyramine (QUESTRAN) 4 gram packet Take 1 Packet by mouth daily as needed (loose stool).  30 Packet 5    atorvastatin (LIPITOR) 40 mg tablet TAKE 1 TABLET EVERY DAY FOR CHOLESTEROL, HEART (NEW DOSE) 90 Tablet 3    amLODIPine (NORVASC) 5 mg tablet TAKE 1 TABLET EVERY DAY  FOR  PRESSURE  AND  HEART 90 Tablet 3    metoprolol succinate (TOPROL-XL) 50 mg XL tablet TAKE 1 TABLET EVERY DAY 90 Tablet 3    dorzolamide-timoloL (COSOPT) 22.3-6.8 mg/mL ophthalmic solution Administer 1 Drop to both eyes two (2) times a day.  aspirin delayed-release 81 mg tablet Take 1 Tab by mouth daily. 100 Tab 3    multivit-mineral-iron-lutein (CENTRUM SILVER ULTRA WOMEN'S) tab tablet Take 1 Tab by mouth daily.        Allergies   Allergen Reactions    Dicyclomine Diarrhea    Olanzapine Unknown (comments)    Synthroid [Levothyroxine] Rash    Ace Inhibitors Cough       Family History   Problem Relation Age of Onset    Heart Disease Mother     Stroke Father      Social History     Tobacco Use    Smoking status: Former Smoker     Quit date: 10/12/1976     Years since quittin.7    Smokeless tobacco: Never Used   Substance Use Topics    Alcohol use: No         [employer]

## 2022-06-29 NOTE — PROGRESS NOTES
Milda Lombard is a 80 y.o. female     Chief Complaint   Patient presents with    Annual Wellness Visit    Hypertension       HPI:  Urinary frequency  Pt reports about 3x/d, 3-4x/night, with increased urgency and burning. No hematuria or pyuria. No f/c. No hx of cystocele but has been a while since last exam.    Hypertension/ CKD 3b. Blood pressures have been improving. Management at last visit included d/c thiazide due to concerns about dropping GFRaa. Current regimen: ARB, beta-blocker, calcium channel blocker. Symptoms include no sx. The red, tender area to the R instep has resolved. Denies CP, palpitations, or syncope. Lab review:   Lab Results   Component Value Date/Time    Sodium 141 12/17/2021 11:06 AM    Potassium 3.8 12/17/2021 11:06 AM    Chloride 110 (H) 12/17/2021 11:06 AM    CO2 25 12/17/2021 11:06 AM    Anion gap 6 12/17/2021 11:06 AM    Glucose 99 12/17/2021 11:06 AM    BUN 25 (H) 12/17/2021 11:06 AM    Creatinine 1.62 (H) 12/17/2021 11:06 AM    BUN/Creatinine ratio 15 12/17/2021 11:06 AM    GFR est AA 37 (L) 12/17/2021 11:06 AM    GFR est non-AA 30 (L) 12/17/2021 11:06 AM    Calcium 9.6 12/17/2021 11:06 AM     Hyperlipidemia. On lipitor 80mg. Taran well. No myalgias, arthralgias, unusual weakness. Pretty much in goal on 11/2018 check. Lab Results   Component Value Date/Time    Cholesterol, total 190 09/14/2021 12:15 PM    HDL Cholesterol 58 09/14/2021 12:15 PM    LDL, calculated 103.6 (H) 09/14/2021 12:15 PM    VLDL, calculated 28.4 09/14/2021 12:15 PM    Triglyceride 142 09/14/2021 12:15 PM    CHOL/HDL Ratio 3.3 09/14/2021 12:15 PM     Lab Results   Component Value Date/Time    ALT (SGPT) 25 09/14/2021 12:15 PM    Alk. phosphatase 72 09/14/2021 12:15 PM    Bilirubin, total 0.5 09/14/2021 12:15 PM     PMH, SH, Medications/Allergies: reviewed, on chart.   Current Outpatient Medications   Medication Sig    atorvastatin (LIPITOR) 40 mg tablet TAKE 1 TABLET EVERY DAY FOR CHOLESTEROL, HEART (NEW DOSE)    valsartan (DIOVAN) 80 mg tablet Take 1 Tablet by mouth two (2) times a day. Indications: pressure    cholestyramine (QUESTRAN) 4 gram packet Take 1 Packet by mouth daily as needed (loose stool).  amLODIPine (NORVASC) 5 mg tablet TAKE 1 TABLET EVERY DAY  FOR  PRESSURE  AND  HEART    metoprolol succinate (TOPROL-XL) 50 mg XL tablet TAKE 1 TABLET EVERY DAY    dorzolamide-timoloL (COSOPT) 22.3-6.8 mg/mL ophthalmic solution Administer 1 Drop to both eyes two (2) times a day.  aspirin delayed-release 81 mg tablet Take 1 Tab by mouth daily.  multivit-mineral-iron-lutein (CENTRUM SILVER ULTRA WOMEN'S) tab tablet Take 1 Tab by mouth daily. No current facility-administered medications for this visit. ROS:  Constitutional: No fever, chills or weight loss  Respiratory: No cough, SOB   CV: No chest pain or Palpitations    Visit Vitals  /60 (BP 1 Location: Left upper arm, BP Patient Position: Sitting, BP Cuff Size: Adult long)   Pulse 93   Temp 97.8 °F (36.6 °C) (Temporal)   Resp 16   Ht 5' 5\" (1.651 m)   Wt 116 lb (52.6 kg)   SpO2 99%   BMI 19.30 kg/m²     Wt Readings from Last 3 Encounters:   06/29/22 116 lb (52.6 kg)   04/20/22 119 lb 4 oz (54.1 kg)   02/18/22 115 lb 8 oz (52.4 kg)   -3#  BP Readings from Last 3 Encounters:   06/29/22 138/60   04/20/22 118/60   02/18/22 130/60     Physical Examination: General appearance - alert, thin but fit appearing, and in no distress  Mental status - alert, oriented to person, place, and time  Eyes - pupils equal and reactive, extraocular eye movements intact  ENT - bilateral external ears and nose normal. Normal lips  Neck - supple, no significant adenopathy, no thyromegaly or mass  Lymphatics - no palpable lymphadenopathy, no hepatosplenomegaly  Chest - clear to auscultation, no wheezes, rales or rhonchi, symmetric air entry  Heart - normal rate, irregular rhythm, normal S1, S2, no murmurs, rubs, clicks or gallops  Abd - NABS.  SNT, no HSM/Mass. Extremities - peripheral pulses normal, no pedal edema, healed lesion to ankle    Results for orders placed or performed during the hospital encounter of 05/10/22   CULTURE, URINE    Specimen: Urine   Result Value Ref Range    Special Requests: NO SPECIAL REQUESTS      [place] Count [id number]  COLONIES/mL        Culture result: MIXED UROGENITAL ASTON ISOLATED         A/p:  Urinary urgency and dysuria  Persistent. Some bladder outlet obst sx. Not improved with oxybutynin. Never saw [facility] Rain[facility]. Rec to see them. Send cx to r/o lingering infection. HTN and CKD3  well controlled. con't current tx. Edema remains in remission. Con't plain diovan + norvasc + toprol XL 50. HLD and ASCVD  Doing well on lipitor 40mg, questran, and ASA 81mg/d. Plan Recheck labs fall 2022, adj PRN. HLD and loose stools  Doing well on questran and lipitor. con't. Monitor. F/U 3mo for recheck/PRN    1. Have you been to the ER, urgent care clinic since your last visit? Hospitalized since your last visit? No    2. Have you seen or consulted any other health care providers outside of the [address] [place] [address] since your last visit? Include any pap smears or colon screening.  No

## 2022-09-30 ENCOUNTER — OFFICE VISIT (OUTPATIENT)
Dept: FAMILY MEDICINE CLINIC | Age: 87
End: 2022-09-30
Payer: MEDICARE

## 2022-09-30 VITALS
TEMPERATURE: 97.1 F | RESPIRATION RATE: 22 BRPM | BODY MASS INDEX: 18.49 KG/M2 | SYSTOLIC BLOOD PRESSURE: 140 MMHG | HEART RATE: 98 BPM | DIASTOLIC BLOOD PRESSURE: 60 MMHG | HEIGHT: 65 IN | WEIGHT: 111 LBS | OXYGEN SATURATION: 99 %

## 2022-09-30 DIAGNOSIS — N95.2 ATROPHIC VAGINITIS: Primary | ICD-10-CM

## 2022-09-30 DIAGNOSIS — I10 PRIMARY HYPERTENSION: ICD-10-CM

## 2022-09-30 DIAGNOSIS — S93.401S SPRAIN OF RIGHT ANKLE, UNSPECIFIED LIGAMENT, SEQUELA: ICD-10-CM

## 2022-09-30 DIAGNOSIS — Z87.891 EX-SMOKER: ICD-10-CM

## 2022-09-30 DIAGNOSIS — I25.10 ASCVD (ARTERIOSCLEROTIC CARDIOVASCULAR DISEASE): ICD-10-CM

## 2022-09-30 DIAGNOSIS — Z23 NEEDS FLU SHOT: ICD-10-CM

## 2022-09-30 DIAGNOSIS — E78.2 MIXED HYPERLIPIDEMIA: ICD-10-CM

## 2022-09-30 DIAGNOSIS — R63.4 WEIGHT LOSS, NON-INTENTIONAL: ICD-10-CM

## 2022-09-30 PROCEDURE — 90694 VACC AIIV4 NO PRSRV 0.5ML IM: CPT | Performed by: FAMILY MEDICINE

## 2022-09-30 PROCEDURE — G0008 ADMIN INFLUENZA VIRUS VAC: HCPCS | Performed by: FAMILY MEDICINE

## 2022-09-30 PROCEDURE — 99214 OFFICE O/P EST MOD 30 MIN: CPT | Performed by: FAMILY MEDICINE

## 2022-09-30 RX ORDER — ESTRADIOL 0.5 MG/1
TABLET ORAL
Qty: 15 TABLET | Refills: 0 | Status: SHIPPED | OUTPATIENT
Start: 2022-09-30

## 2022-09-30 NOTE — PROGRESS NOTES
Billy Burt is a 80 y.o. female presenting for/with:    Chief Complaint   Patient presents with    Hypertension     3 month follow up    GERD       Visit Vitals  BP (!) 152/64   Pulse 98   Temp 97.1 °F (36.2 °C) (Temporal)   Resp 22   Ht 5' 5\" (1.651 m)   Wt 111 lb (50.3 kg)   SpO2 99%   BMI 18.47 kg/m²     Pain Scale: 0 - No pain/10  Pain Location:     1. \"Have you been to the ER, urgent care clinic since your last visit? Hospitalized since your last visit? \" YES- ER for sprained ankle    2. \"Have you seen or consulted any other health care providers outside of the [address] [place] [address] since your last visit? \" No     3. For patients aged 39-70: Has the patient had a colonoscopy / FIT/ Cologuard? NA - based on age      If the patient is female:    4. For patients aged 41-77: Has the patient had a mammogram within the past 2 years? NA - based on age or sex      11. For patients aged 21-65: Has the patient had a pap smear? NA - based on age or sex      Symptom review:  Learning Assessment 6/29/2022   PRIMARY LEARNER Patient   PRIMARY LANGUAGE ENGLISH   LEARNER PREFERENCE PRIMARY DEMONSTRATION   ANSWERED BY self   RELATIONSHIP SELF     Fall Risk Assessment, last 12 mths 9/30/2022   Able to walk? Yes   Fall in past 12 months? 1   Do you feel unsteady? 0   Are you worried about falling 0   Is TUG test greater than 12 seconds? -   Is the gait abnormal? 0   Number of falls in past 12 months 1   Fall with injury? 1       3 most recent PHQ Screens 9/30/2022   PHQ Not Done -   Little interest or pleasure in doing things Not at all   Feeling down, depressed, irritable, or hopeless Not at all   Total Score PHQ 2 0     Abuse Screening Questionnaire 9/30/2022   Do you ever feel afraid of your partner? N   Are you in a relationship with someone who physically or mentally threatens you? N   Is it safe for you to go home?  Y       ADL Assessment 9/30/2022   Feeding yourself No Help Needed   Getting from bed to chair No Help Needed Getting dressed No Help Needed   Bathing or showering No Help Needed   Walk across the room (includes cane/walker) No Help Needed   Using the telphone No Help Needed   Taking your medications No Help Needed   Preparing meals No Help Needed   Managing money (expenses/bills) No Help Needed   Moderately strenuous housework (laundry) No Help Needed   Shopping for personal items (toiletries/medicines) No Help Needed   Shopping for groceries No Help Needed   Driving No Help Needed   Climbing a flight of stairs No Help Needed   Getting to places beyond walking distances No Help Needed      Advance Care Planning 9/30/2022   Patient's Healthcare Decision Maker is: Named in scanned ACP document   Confirm Advance Directive Yes, on file   Patient Would Like to Complete Advance Directive -

## 2022-09-30 NOTE — PROGRESS NOTES
Delma Santacruz is a 80 y.o. female     Chief Complaint   Patient presents with    Hypertension     3 month follow up    GERD       HPI:  Urinary frequency  Pt reports about 3x/d, 3-4x/night, with increased urgency and burning. No hematuria or pyuria. No f/c. No hx of cystocele but has been a while since last exam.    Hypertension/ CKD 3b. Blood pressures have been improving. Management at last visit included d/c thiazide due to concerns about dropping GFRaa. Current regimen: ARB, beta-blocker, calcium channel blocker. Symptoms include no sx. The red, tender area to the R instep has resolved. Denies CP, palpitations, or syncope. Lab review:   Lab Results   Component Value Date/Time    Sodium 141 12/17/2021 11:06 AM    Potassium 3.8 12/17/2021 11:06 AM    Chloride 110 (H) 12/17/2021 11:06 AM    CO2 25 12/17/2021 11:06 AM    Anion gap 6 12/17/2021 11:06 AM    Glucose 99 12/17/2021 11:06 AM    BUN 25 (H) 12/17/2021 11:06 AM    Creatinine 1.62 (H) 12/17/2021 11:06 AM    BUN/Creatinine ratio 15 12/17/2021 11:06 AM    GFR est AA 37 (L) 12/17/2021 11:06 AM    GFR est non-AA 30 (L) 12/17/2021 11:06 AM    Calcium 9.6 12/17/2021 11:06 AM     Hyperlipidemia. On lipitor 80mg. Taran well. No myalgias, arthralgias, unusual weakness. Pretty much in goal on 11/2018 check. Lab Results   Component Value Date/Time    Cholesterol, total 190 09/14/2021 12:15 PM    HDL Cholesterol 58 09/14/2021 12:15 PM    LDL, calculated 103.6 (H) 09/14/2021 12:15 PM    VLDL, calculated 28.4 09/14/2021 12:15 PM    Triglyceride 142 09/14/2021 12:15 PM    CHOL/HDL Ratio 3.3 09/14/2021 12:15 PM     Lab Results   Component Value Date/Time    ALT (SGPT) 25 09/14/2021 12:15 PM    Alk. phosphatase 72 09/14/2021 12:15 PM    Bilirubin, total 0.5 09/14/2021 12:15 PM     PMH, SH, Medications/Allergies: reviewed, on chart. Current Outpatient Medications   Medication Sig    valsartan (DIOVAN) 80 mg tablet Take 1 Tablet by mouth two (2) times a day.  Indications: pressure    cholestyramine (QUESTRAN) 4 gram packet Take 1 Packet by mouth daily as needed (loose stool). atorvastatin (LIPITOR) 40 mg tablet TAKE 1 TABLET EVERY DAY FOR CHOLESTEROL, HEART (NEW DOSE)    amLODIPine (NORVASC) 5 mg tablet TAKE 1 TABLET EVERY DAY  FOR  PRESSURE  AND  HEART    metoprolol succinate (TOPROL-XL) 50 mg XL tablet TAKE 1 TABLET EVERY DAY    dorzolamide-timoloL (COSOPT) 22.3-6.8 mg/mL ophthalmic solution Administer 1 Drop to both eyes two (2) times a day. aspirin delayed-release 81 mg tablet Take 1 Tab by mouth daily. multivit-mineral-iron-lutein (WOMEN'S CENTRUM SILVER WITH LUTEIN) tab tablet Take 1 Tab by mouth daily. No current facility-administered medications for this visit. ROS:  Constitutional: No fever, chills or weight loss  Respiratory: No cough, SOB   CV: No chest pain or Palpitations    Visit Vitals  BP (!) 152/64   Pulse 98   Temp 97.1 °F (36.2 °C) (Temporal)   Resp 22   Ht 5' 5\" (1.651 m)   Wt 111 lb (50.3 kg)   SpO2 99%   BMI 18.47 kg/m²     Wt Readings from Last 3 Encounters:   09/30/22 111 lb (50.3 kg)   06/29/22 116 lb (52.6 kg)   04/20/22 119 lb 4 oz (54.1 kg)   -5#  BP Readings from Last 3 Encounters:   09/30/22 (!) 152/64   06/29/22 138/60   04/20/22 118/60     Physical Examination: General appearance - alert, thin but fit appearing, and in no distress  Mental status - alert, oriented to person, place, and time  Eyes - pupils equal and reactive, extraocular eye movements intact  ENT - bilateral external ears and nose normal. Normal lips  Neck - supple, no significant adenopathy, no thyromegaly or mass  Lymphatics - no palpable lymphadenopathy, no hepatosplenomegaly  Chest - clear to auscultation, no wheezes, rales or rhonchi, symmetric air entry  Heart - normal rate, irregular rhythm, normal S1, S2, no murmurs, rubs, clicks or gallops  Abd - NABS. SNT, no HSM/Mass.   Extremities - peripheral pulses normal, no pedal edema, healed lesion to ankle      A/p:  Urinary urgency and dysuria, with atrophic vaginitis. Seeing [facility], did get some relief with pyridium. Rec starting topical E2 tx. Wt loss, undesired  8# in past 6mo without trying. Hx smoking. Check CXR, CBC, and review other labs. Consider abd /pelv CT. HTN and CKD3  Ok today. Con't current tx. Edema remains in remission. Con't plain diovan + norvasc + toprol XL 50. Check labs. HLD and ASCVD  Doing well on lipitor 40mg, questran, and ASA 81mg/d. Recheck labs, adj PRN. HLD and loose stools  Now stools are back to normal. Keep on lipitor, ok to hold questran. Monitor. Pt with R ankle sprain  Will setup with annie in [place] for PT    F/U 1mo for recheck/PRN    1. Have you been to the ER, urgent care clinic since your last visit? Hospitalized since your last visit? No    2. Have you seen or consulted any other health care providers outside of the [address] [place] [address] since your last visit? Include any pap smears or colon screening.  No

## 2022-09-30 NOTE — PATIENT INSTRUCTIONS
Vaccine Information Statement    Influenza (Flu) Vaccine (Inactivated or Recombinant): What You Need to Know    Many vaccine information statements are available in Irish and other languages. See www.immunize.org/vis. Hojas de información sobre vacunas están disponibles en español y en muchos otros idiomas. Visite www.immunize.org/vis. 1. Why get vaccinated? Influenza vaccine can prevent influenza (flu). Flu is a contagious disease that spreads around the [facility] every year, usually between October and May. Anyone can get the flu, but it is more dangerous for some people. Infants and young children, people 72 years and older, pregnant people, and people with certain health conditions or a weakened immune system are at greatest risk of flu complications. Pneumonia, bronchitis, sinus infections, and ear infections are examples of flu-related complications. If you have a medical condition, such as heart disease, cancer, or diabetes, flu can make it worse. Flu can cause fever and chills, sore throat, muscle aches, fatigue, cough, headache, and runny or stuffy nose. Some people may have vomiting and diarrhea, though this is more common in children than adults. In an average year, thousands of people in the [facility] die from flu, and many more are hospitalized. Flu vaccine prevents millions of illnesses and flu-related visits to the doctor each year. 2. Influenza vaccines     CDC recommends everyone 6 months and older get vaccinated every flu season. Children 6 months through 6years of age may need 2 doses during a single flu season. Everyone else needs only 1 dose each flu season. It takes about 2 weeks for protection to develop after vaccination. There are many flu viruses, and they are always changing. Each year a new flu vaccine is made to protect against the influenza viruses believed to be likely to cause disease in the upcoming flu season.  Even when the vaccine doesnt exactly match these viruses, it may still provide some protection. Influenza vaccine does not cause flu. Influenza vaccine may be given at the same time as other vaccines. 3. Talk with your health care provider    Tell your vaccination provider if the person getting the vaccine:  Has had an allergic reaction after a previous dose of influenza vaccine, or has any severe, life-threatening allergies   Has ever had Guillain-Barré Syndrome (also called GBS)    In some cases, your health care provider may decide to postpone influenza vaccination until a future visit. Influenza vaccine can be administered at any time during pregnancy. People who are or will be pregnant during influenza season should receive inactivated influenza vaccine. People with minor illnesses, such as a cold, may be vaccinated. People who are moderately or severely ill should usually wait until they recover before getting influenza vaccine. Your health care provider can give you more information. 4. Risks of a vaccine reaction    Soreness, redness, and swelling where the shot is given, fever, muscle aches, and headache can happen after influenza vaccination. There may be a very small increased risk of Guillain-Barré Syndrome (GBS) after inactivated influenza vaccine (the flu shot). Manasa Speck children who get the flu shot along with pneumococcal vaccine (PCV13) and/or DTaP vaccine at the same time might be slightly more likely to have a seizure caused by fever. Tell your health care provider if a child who is getting flu vaccine has ever had a seizure. People sometimes faint after medical procedures, including vaccination. Tell your provider if you feel dizzy or have vision changes or ringing in the ears. As with any medicine, there is a very remote chance of a vaccine causing a severe allergic reaction, other serious injury, or death. 5. What if there is a serious problem?     An allergic reaction could occur after the vaccinated person leaves the clinic. If you see signs of a severe allergic reaction (hives, swelling of the face and throat, difficulty breathing, a fast heartbeat, dizziness, or weakness), call 9-1-1 and get the person to the nearest hospital.    For other signs that concern you, call your health care provider. Adverse reactions should be reported to the Vaccine Adverse Event Reporting System (VAERS). Your health care provider will usually file this report, or you can do it yourself. Visit the VAERS website at www.vaers. [facility].gov or call 5-504.456.8107. VAERS is only for reporting reactions, and VAERS staff members do not give medical advice. 6. The National Vaccine Injury Compensation Program    The [facility] Vaccine Injury Compensation Program (VICP) is a federal program that was created to compensate people who may have been injured by certain vaccines. Claims regarding alleged injury or death due to vaccination have a time limit for filing, which may be as short as two years. Visit the VICP website at www.[facility].gov/vaccinecompensation or call 2-446.727.4765 to learn about the program and about filing a claim. 7. How can I learn more? Ask your health care provider. Call your local or state health department. Visit the website of the Food and Drug Administration (FDA) for vaccine package inserts and additional information at www.fda.gov/vaccines-blood-biologics/vaccines. Contact the Centers for Disease Control and Prevention (CDC): Call 0-402.503.2859 (0-487-CEJ-INFO) or  Visit CDCs influenza website at www.cdc.gov/flu. Vaccine Information Statement   Inactivated Influenza Vaccine   8/6/2021  42 TONI Peralta [id number]-[id number]   Department of Health and Human Services  Centers for Disease Control and Prevention    Office Use Only

## 2022-11-03 ENCOUNTER — TELEPHONE (OUTPATIENT)
Dept: FAMILY MEDICINE CLINIC | Age: 87
End: 2022-11-03

## 2022-11-09 ENCOUNTER — OFFICE VISIT (OUTPATIENT)
Dept: FAMILY MEDICINE CLINIC | Age: 87
End: 2022-11-09
Payer: MEDICARE

## 2022-11-09 VITALS
RESPIRATION RATE: 18 BRPM | OXYGEN SATURATION: 99 % | BODY MASS INDEX: 18.35 KG/M2 | TEMPERATURE: 97.3 F | HEIGHT: 65 IN | WEIGHT: 110.13 LBS | SYSTOLIC BLOOD PRESSURE: 118 MMHG | DIASTOLIC BLOOD PRESSURE: 72 MMHG | HEART RATE: 78 BPM

## 2022-11-09 DIAGNOSIS — E78.2 MIXED HYPERLIPIDEMIA: ICD-10-CM

## 2022-11-09 DIAGNOSIS — I10 PRIMARY HYPERTENSION: Primary | ICD-10-CM

## 2022-11-09 DIAGNOSIS — I25.10 ASCVD (ARTERIOSCLEROTIC CARDIOVASCULAR DISEASE): ICD-10-CM

## 2022-11-09 DIAGNOSIS — I65.23 BILATERAL CAROTID ARTERY STENOSIS: ICD-10-CM

## 2022-11-09 DIAGNOSIS — I10 ESSENTIAL HYPERTENSION: ICD-10-CM

## 2022-11-09 PROCEDURE — 99214 OFFICE O/P EST MOD 30 MIN: CPT | Performed by: FAMILY MEDICINE

## 2022-11-09 RX ORDER — METOPROLOL SUCCINATE 50 MG/1
TABLET, EXTENDED RELEASE ORAL
Qty: 90 TABLET | Refills: 3 | Status: SHIPPED | OUTPATIENT
Start: 2022-12-20

## 2022-11-09 NOTE — PROGRESS NOTES
Visit Vitals  /72   Pulse 78   Temp 97.3 °F (36.3 °C) (Temporal)   Resp 18   Ht 5' 5\" (1.651 m)   Wt 110 lb 2 oz (50 kg)   SpO2 99%   BMI 18.33 kg/m²     Chief Complaint   Patient presents with    Hypertension     1. Have you been to the ER, urgent care clinic since your last visit? Hospitalized since your last visit? No    2. Have you seen or consulted any other health care providers outside of the [address] [place] [address] since your last visit? Include any pap smears or colon screening.  No

## 2022-11-09 NOTE — PROGRESS NOTES
Francisca Moran is a 80 y.o. female     Chief Complaint   Patient presents with    Hypertension       HPI:  Urinary frequency  Pt reports about 3x/d, 3-4x/night, with increased urgency and burning. No hematuria or pyuria. No f/c. No hx of cystocele but has been a while since last exam. Seeing urology DR. Abby Oliveros, plans procedure soon. Had labs 11/6 at [facility] pt for preop. Hypertension/ CKD 3b. Blood pressures have been improving. Management at last visit included con't current tx. Off thiazide due to concerns about dropping GFRaa. Current regimen: ARB, beta-blocker, calcium channel blocker. Symptoms include no sx. The red, tender area to the R instep has resolved. Denies CP, palpitations, or syncope. Lab review:   Lab Results   Component Value Date/Time    Sodium 141 12/17/2021 11:06 AM    Potassium 3.8 12/17/2021 11:06 AM    Chloride 110 (H) 12/17/2021 11:06 AM    CO2 25 12/17/2021 11:06 AM    Anion gap 6 12/17/2021 11:06 AM    Glucose 99 12/17/2021 11:06 AM    BUN 25 (H) 12/17/2021 11:06 AM    Creatinine 1.62 (H) 12/17/2021 11:06 AM    BUN/Creatinine ratio 15 12/17/2021 11:06 AM    GFR est AA 37 (L) 12/17/2021 11:06 AM    GFR est non-AA 30 (L) 12/17/2021 11:06 AM    Calcium 9.6 12/17/2021 11:06 AM     Hyperlipidemia. On lipitor 80mg. Taran well. No myalgias, arthralgias, unusual weakness. Pretty much in goal on 9/2021 check. Lab Results   Component Value Date/Time    Cholesterol, total 190 09/14/2021 12:15 PM    HDL Cholesterol 58 09/14/2021 12:15 PM    LDL, calculated 103.6 (H) 09/14/2021 12:15 PM    VLDL, calculated 28.4 09/14/2021 12:15 PM    Triglyceride 142 09/14/2021 12:15 PM    CHOL/HDL Ratio 3.3 09/14/2021 12:15 PM     Lab Results   Component Value Date/Time    ALT (SGPT) 25 09/14/2021 12:15 PM    Alk. phosphatase 72 09/14/2021 12:15 PM    Bilirubin, total 0.5 09/14/2021 12:15 PM     PMH, SH, Medications/Allergies: reviewed, on chart.   Current Outpatient Medications   Medication Sig    estradioL (ESTRACE) 0.5 mg tablet 1/4 tab vaginally once weekly  Indications: menopausal changes    valsartan (DIOVAN) 80 mg tablet Take 1 Tablet by mouth two (2) times a day. Indications: pressure    atorvastatin (LIPITOR) 40 mg tablet TAKE 1 TABLET EVERY DAY FOR CHOLESTEROL, HEART (NEW DOSE)    amLODIPine (NORVASC) 5 mg tablet TAKE 1 TABLET EVERY DAY  FOR  PRESSURE  AND  HEART    metoprolol succinate (TOPROL-XL) 50 mg XL tablet TAKE 1 TABLET EVERY DAY    dorzolamide-timoloL (COSOPT) 22.3-6.8 mg/mL ophthalmic solution Administer 1 Drop to both eyes two (2) times a day. aspirin delayed-release 81 mg tablet Take 1 Tab by mouth daily. multivit-mineral-iron-lutein (WOMEN'S CENTRUM SILVER WITH LUTEIN) tab tablet Take 1 Tab by mouth daily. No current facility-administered medications for this visit. ROS:  Constitutional: No fever, chills or weight loss  Respiratory: No cough, SOB   CV: No chest pain or Palpitations    Visit Vitals  /72   Pulse 78   Temp 97.3 °F (36.3 °C) (Temporal)   Resp 18   Ht 5' 5\" (1.651 m)   Wt 110 lb 2 oz (50 kg)   SpO2 99%   BMI 18.33 kg/m²     Wt Readings from Last 3 Encounters:   11/09/22 110 lb 2 oz (50 kg)   09/30/22 111 lb (50.3 kg)   06/29/22 116 lb (52.6 kg)   -1#  BP Readings from Last 3 Encounters:   11/09/22 118/72   09/30/22 (!) 140/60   06/29/22 138/60     Physical Examination: General appearance - alert, thin but fit appearing, and in no distress  Mental status - alert, oriented to person, place, and time  Eyes - pupils equal and reactive, extraocular eye movements intact  ENT - bilateral external ears and nose normal. Normal lips  Neck - supple, no significant adenopathy, no thyromegaly or mass  Lymphatics - no palpable lymphadenopathy, no hepatosplenomegaly  Chest - clear to auscultation, no wheezes, rales or rhonchi, symmetric air entry  Heart - normal rate, regular rhythm, normal S1, S2, no murmurs, rubs, clicks or gallops  Abd - NABS. SNT, no HSM/Mass.   Extremities - peripheral pulses normal, no pedal edema, healed lesion to ankle    A/p:  Urinary urgency and dysuria, with atrophic vaginitis. Seeing [facility], plans cystoscopy and ureteral dilation 11/16 at [facility] pt clinic. Con't topical E2 tx. Wt loss  Stable now. Never did get the CXR. Last CBC and labs reassuring. Monitor for now, Consider getting CXR, Abd /pelv CT. HTN and CKD3  Ok today. Con't current tx. Edema remains in remission. Con't plain diovan + norvasc + toprol XL 50. Check labs. HLD and ASCVD  Doing well on lipitor 40mg, questran, and ASA 81mg/d. Recheck labs, adj PRN. HLD and loose stools  Stools back to normal. Keep on lipitor, ok to hold Kayla. Monitor.     F/U 3mo for recheck/PRN

## 2023-02-17 ENCOUNTER — TELEPHONE (OUTPATIENT)
Dept: FAMILY MEDICINE CLINIC | Age: 88
End: 2023-02-17

## 2023-02-17 NOTE — TELEPHONE ENCOUNTER
----- Message from Amor Chiu sent at 2023  8:14 AM EST -----  Subject: Message to Provider    QUESTIONS  Information for Provider? Patient's daughter called to let Dr Melodye Apgar know   that the patient was hospitalized at [address] [place] [address] for dehydration and   side effects of recent radiation tx. Patient had appt for this morning and   it was canceled by daughter, Claudean Rothman. Please advise. Thank you  ---------------------------------------------------------------------------  --------------  Branda Brittle INFO  [phone]; OK to leave message on voicemail  ---------------------------------------------------------------------------  --------------  SCRIPT ANSWERS  Relationship to Patient?  Self

## 2023-02-21 ENCOUNTER — OFFICE VISIT (OUTPATIENT)
Dept: FAMILY MEDICINE CLINIC | Age: 88
End: 2023-02-21
Payer: MEDICARE

## 2023-02-21 VITALS
HEART RATE: 115 BPM | SYSTOLIC BLOOD PRESSURE: 98 MMHG | RESPIRATION RATE: 18 BRPM | WEIGHT: 93 LBS | DIASTOLIC BLOOD PRESSURE: 48 MMHG | BODY MASS INDEX: 15.49 KG/M2 | TEMPERATURE: 97.5 F | HEIGHT: 65 IN

## 2023-02-21 DIAGNOSIS — N18.30 STAGE 3 CHRONIC KIDNEY DISEASE, UNSPECIFIED WHETHER STAGE 3A OR 3B CKD (HCC): ICD-10-CM

## 2023-02-21 DIAGNOSIS — C67.8 MALIGNANT NEOPLASM OF OVERLAPPING SITES OF BLADDER (HCC): ICD-10-CM

## 2023-02-21 DIAGNOSIS — L89.152 PRESSURE INJURY OF SACRAL REGION, STAGE 2 (HCC): ICD-10-CM

## 2023-02-21 DIAGNOSIS — D63.8 ANEMIA OF CHRONIC DISEASE: Primary | ICD-10-CM

## 2023-02-21 DIAGNOSIS — I10 PRIMARY HYPERTENSION: ICD-10-CM

## 2023-02-21 DIAGNOSIS — N17.9 AKI (ACUTE KIDNEY INJURY) (HCC): ICD-10-CM

## 2023-02-21 DIAGNOSIS — T66.XXXA RADIATION THERAPY COMPLICATION, INITIAL ENCOUNTER: ICD-10-CM

## 2023-02-21 DIAGNOSIS — N13.9 ACUTE UNILATERAL OBSTRUCTIVE UROPATHY: ICD-10-CM

## 2023-02-21 PROCEDURE — G8418 CALC BMI BLW LOW PARAM F/U: HCPCS | Performed by: FAMILY MEDICINE

## 2023-02-21 PROCEDURE — 1101F PT FALLS ASSESS-DOCD LE1/YR: CPT | Performed by: FAMILY MEDICINE

## 2023-02-21 PROCEDURE — 99215 OFFICE O/P EST HI 40 MIN: CPT | Performed by: FAMILY MEDICINE

## 2023-02-21 PROCEDURE — 1090F PRES/ABSN URINE INCON ASSESS: CPT | Performed by: FAMILY MEDICINE

## 2023-02-21 PROCEDURE — G8427 DOCREV CUR MEDS BY ELIG CLIN: HCPCS | Performed by: FAMILY MEDICINE

## 2023-02-21 PROCEDURE — G8536 NO DOC ELDER MAL SCRN: HCPCS | Performed by: FAMILY MEDICINE

## 2023-02-21 PROCEDURE — G8510 SCR DEP NEG, NO PLAN REQD: HCPCS | Performed by: FAMILY MEDICINE

## 2023-02-21 PROCEDURE — G0444 DEPRESSION SCREEN ANNUAL: HCPCS | Performed by: FAMILY MEDICINE

## 2023-02-21 PROCEDURE — 1123F ACP DISCUSS/DSCN MKR DOCD: CPT | Performed by: FAMILY MEDICINE

## 2023-02-21 PROCEDURE — 85018 HEMOGLOBIN: CPT | Performed by: FAMILY MEDICINE

## 2023-02-21 RX ORDER — PHENAZOPYRIDINE HYDROCHLORIDE 100 MG/1
TABLET, FILM COATED ORAL
COMMUNITY

## 2023-02-21 RX ORDER — PANTOPRAZOLE SODIUM 40 MG/1
40 GRANULE, DELAYED RELEASE ORAL DAILY
COMMUNITY

## 2023-02-21 RX ORDER — MEGESTROL ACETATE 40 MG/ML
200 SUSPENSION ORAL DAILY
COMMUNITY

## 2023-02-21 NOTE — PROGRESS NOTES
Julee Blue is a 80 y.o. female presenting for/with:    Chief Complaint   Patient presents with    Hospital Follow Up     [facility] Dehydrated. Doing radiation. Needs lovenox teaching (has not had since leaving the hospital)    Incontinence     Needs rx for supplies    Nausea    Skin Problem     Sacrum         Visit Vitals  BP (!) 98/48   Pulse 88   Temp 97.5 °F (36.4 °C) (Temporal)   Resp 18   Ht 5' 5\" (1.651 m)   Wt 93 lb (42.2 kg)   BMI 15.48 kg/m²     Pain Scale: /10  Pain Location:     1. \"Have you been to the ER, urgent care clinic since your last visit? Hospitalized since your last visit? \" Yes Where: [facility]     2. \"Have you seen or consulted any other health care providers outside of the [address] [place] [address] since your last visit? \" No     3. For patients aged 39-70: Has the patient had a colonoscopy / FIT/ Cologuard? NA - based on age      If the patient is female:    4. For patients aged 41-77: Has the patient had a mammogram within the past 2 years? NA - based on age or sex      11. For patients aged 21-65: Has the patient had a pap smear? NA - based on age or sex      Symptom review:  Learning Assessment 6/29/2022   PRIMARY LEARNER Patient   PRIMARY LANGUAGE ENGLISH   LEARNER PREFERENCE PRIMARY DEMONSTRATION   ANSWERED BY self   RELATIONSHIP SELF     Fall Risk Assessment, last 12 mths 2/21/2023   Able to walk? Yes   Fall in past 12 months? 1   Do you feel unsteady? 1   Are you worried about falling 1   Is TUG test greater than 12 seconds? -   Is the gait abnormal? -   Number of falls in past 12 months 2   Fall with injury? 0       3 most recent PHQ Screens 2/21/2023   PHQ Not Done -   Little interest or pleasure in doing things Not at all   Feeling down, depressed, irritable, or hopeless Not at all   Total Score PHQ 2 0     Abuse Screening Questionnaire 2/21/2023   Do you ever feel afraid of your partner? N   Are you in a relationship with someone who physically or mentally threatens you?  N   Is it safe for you to go home?  Y       ADL Assessment 2/21/2023   Feeding yourself No Help Needed   Getting from bed to chair Help Needed   Getting dressed Help Needed   Bathing or showering Help Needed   Walk across the room (includes cane/walker) Help Needed   Using the telphone No Help Needed   Taking your medications Help Needed   Preparing meals Help Needed   Managing money (expenses/bills) Help Needed   Moderately strenuous housework (laundry) Help Needed   Shopping for personal items (toiletries/medicines) Help Needed   Shopping for groceries Help Needed   Driving Help Needed   Climbing a flight of stairs Help Needed   Getting to places beyond walking distances Help Needed      Advance Care Planning 2/21/2023   Patient's Healthcare Decision Maker is: Named in scanned ACP document   Confirm Advance Directive Yes, on file   Patient Would Like to Complete Advance Directive -

## 2023-02-21 NOTE — PROGRESS NOTES
Verito Pena is a 80 y.o. female     Chief Complaint   Patient presents with    Hospital Follow Up     [facility] Dehydrated. Doing radiation. Needs lovenox teaching (has not had since leaving the hospital)    Incontinence     Needs rx for supplies    Nausea    Skin Problem     Sacrum       HPI:  Pt here for [facility] from [facility]  Admission Date: 2/16/2023 Discharge Date: 2/18/2023. FESTUS call 2/20/2022 by [facility] Patient Jessica Sebastian RN - 02/20/2023 2:44 PM EST    Discharge summary and admission notes reviewed, summarized below:    BRIEF OVERVIEW  Admitting Provider: Claudia Bush MD  Discharge Provider: Akira Urrutia MD  Primary Care Physician at Discharge: Jaclyn Kumar MD     Admission Date: 2/16/2023 Discharge Date: 2/18/2023    Primary Discharge Diagnosis:  Generalized Weakness and Dehydration    Outpatient Follow-Up:  Future Appointments   Date Time Provider Tino Lopez   3/14/2023 10:00 AM Brandie Barrett MD P RAD ONC [facility]     Problem List:  Active Problems:  Malignant neoplasm of overlapping sites of bladder (CMS/HCC)  Generalized weakness    [address] [facility]    Reason for Hospitalization:  Dysuria [R30.0]  AMANDA (acute kidney injury) (CMS/HCC) [N17.9]  Generalized weakness [R53.1]  Anemia, unspecified type [D64.9]    Hospital Course:  Mrs Ray Ferguson is an 80year old female with a history of CKDIII, HTN, HLD, Bladder cancer receiving palliative radiation therapy who was admitted for increasing weakness, fatigue, decreased appetite, weight loss and urinary incontinence. Near the end of her first round of Radiation therapy (16Jan-07Feb) till her admission she was noting decreased appetite and increased fatigue and weakness. She presented with these symptoms to the ED and showed improvement with resolution of lactate with IVF and PO intake. She was evaluated by nutrition who added ensures to her diet.  Upon following up with her Oncology team they noted that it is normal for patients like her to become anorexic and weak. It was agreed upon to trial 2 weeks of Megestrol to increase appetite with Lovenox to decrease risk for VTE. The patients Urinary symptoms are also common in this scenario per the radiation oncology team so it was agreed upon to trial timed voiding and continuing pyridium. She can follow-up with her PCM and Oncologist to trial anti-spasm bladder medications if these fail. She was found to be anemic with elevated ferritin and elevated reticulocyte count without any evidence of blood loss. This is likely due to her cancer and radiation treatment but CT scan found evidence of gastritis so she was placed on IV PPI and will be discharged on oral Protonix. Physical therapy evaluated the patient and recommended she go home with home PT to improve her symptoms. On the day of her discharge she is hemodynamically stable and much stronger than upon presentation. She has follow-up with her Radiation Oncology team on 14March2023  She should see her PCM as well within 2 weeks from discharge  For any symptoms she cannot tolerate at home she should call a healthcare hotline or return to a healthcare provider for further assessment. Discharge Condition:  Improved    Discharge Disposition:  Home    Procedures Performed:    Lab Results:  Scr: 2.56 -> 2.24 -> 2.0     Hgb: 8.4 -> 7.4 -> 8.7  Reticulocyte Count 4.3%  Ferritin 883, Iron 54     Diagnostic Results:  CT Abdomen Pelvis without IV Contrast [id number]  Impression:     1. Gastric circumferential mural thickening worrisome for gastritis or less   commonly gastric malignancy. 2. Moderate right hydronephrosis and hydroureter seen to the pelvis without   obstructing stone. No significant change. 3. Urinary bladder decompressed. Circumferential mural thickening concordant   with the given history of carcinoma. 4. 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 5. Fluid distended vagina.      Discharge Exam:    Visit Vitals  /56   Pulse 80   Temp 36.7 °C (98 °F) (Oral)   Resp 18   Ht 1.676 m   Wt 45.5 kg   SpO2 93%   BMI 16.19 kg/m²   OB Status Postmenopausal   Smoking Status Former   BSA 1.46 m²     Current Discharge Medication List   START taking these medications   Details   enoxaparin (Lovenox) 40 MG/0.4ML solution prefilled syringe Inject 0.4 mL under the skin daily. Qty: 5.6 mL, Refills: 0     megestrol acetate (Megace) 40 mg/mL suspension Take 20 mL by mouth daily for 12 doses. Qty: 240 mL, Refills: 0     pantoprazole (Protonix) 40 MG EC tablet Take 1 tablet by mouth daily. Do not crush, chew, or split. Qty: 30 tablet, Refills: 1       CONTINUE these medications which have NOT CHANGED   Details   acetaminophen (Tylenol) 500 MG tablet Take 500 mg by mouth daily. amLODIPine (Norvasc) 5 MG tablet at bed time. aspirin 81 MG EC tablet at bed time. atorvastatin (Lipitor) 40 mg tablet at bed time. dorzolamide-timolol (Cosopt) 22.3-6.8 MG/ML ophthalmic solution Administer 1 drop into affected eye(s) 2 times daily. hydroCHLOROthiazide (HYDRODiuril) 25 MG tablet Take 25 mg by mouth daily. metoprolol succinate XL (Toprol-XL) 50 MG 24 hr tablet Take 1 tablet by mouth daily. phenazopyridine (Pyridium) 100 MG tablet Take 1 tablet by mouth 3 times a day as needed for bladder spasms. Qty: 10 tablet, Refills: 0   Associated Diagnoses: Malignant neoplasm of overlapping sites of bladder (CMS/HCC)       STOP taking these medications   diclofenac (Voltaren) 1% topical gel   nitrofurantoin, macrocrystal-monohydrate, (Macrobid) 100 MG capsule     Time Spent in Coordination of Discharge:  25 minutes         ___________________________________  Urinary frequency/ Bladder cancer  Had eval with urology 12/2022, DR. Sara Corona. Had cystoscopy, bx showed high grade urothelial ca. Sent for palliative radiation therapy.  Eventually developed dehydration, weakness, radiation proctitis, and was recommended for admission by rad onc 2/16/23, dx with AMANDA, Anemia, weakness. Hypertension/ CKD 3b. Blood pressures too low now. Current regimen: ARB, beta-blocker, calcium channel blocker. Symptoms include weakness. Lab review:   Lab Results   Component Value Date/Time    Sodium 141 12/17/2021 11:06 AM    Potassium 3.8 12/17/2021 11:06 AM    Chloride 110 (H) 12/17/2021 11:06 AM    CO2 25 12/17/2021 11:06 AM    Anion gap 6 12/17/2021 11:06 AM    Glucose 99 12/17/2021 11:06 AM    BUN 25 (H) 12/17/2021 11:06 AM    Creatinine 1.62 (H) 12/17/2021 11:06 AM    BUN/Creatinine ratio 15 12/17/2021 11:06 AM    GFR est AA 37 (L) 12/17/2021 11:06 AM    GFR est non-AA 30 (L) 12/17/2021 11:06 AM    Calcium 9.6 12/17/2021 11:06 AM     Hyperlipidemia. On lipitor 80mg. Taran well. No myalgias, arthralgias, unusual weakness. Pretty much in goal on 9/2021 check. Lab Results   Component Value Date/Time    Cholesterol, total 190 09/14/2021 12:15 PM    HDL Cholesterol 58 09/14/2021 12:15 PM    LDL, calculated 103.6 (H) 09/14/2021 12:15 PM    VLDL, calculated 28.4 09/14/2021 12:15 PM    Triglyceride 142 09/14/2021 12:15 PM    CHOL/HDL Ratio 3.3 09/14/2021 12:15 PM     Lab Results   Component Value Date/Time    ALT (SGPT) 25 09/14/2021 12:15 PM    Alk. phosphatase 72 09/14/2021 12:15 PM    Bilirubin, total 0.5 09/14/2021 12:15 PM     PMH, SH, Medications/Allergies: reviewed, on chart. Current Outpatient Medications   Medication Sig    pantoprazole (PROTONIX) 40 mg granules for oral suspension 40 mg daily. phenazopyridine (PYRIDIUM) 100 mg tablet Take  by mouth three (3) times daily (after meals). megestroL (MEGACE) 400 mg/10 mL (40 mg/mL) suspension Take 200 mg by mouth daily. Take 20ml every day for 12 doses.     metoprolol succinate (TOPROL-XL) 50 mg XL tablet TAKE 1 TABLET EVERY DAY    estradioL (ESTRACE) 0.5 mg tablet 1/4 tab vaginally once weekly  Indications: menopausal changes    valsartan (DIOVAN) 80 mg tablet Take 1 Tablet by mouth two (2) times a day. Indications: pressure    atorvastatin (LIPITOR) 40 mg tablet TAKE 1 TABLET EVERY DAY FOR CHOLESTEROL, HEART (NEW DOSE)    amLODIPine (NORVASC) 5 mg tablet TAKE 1 TABLET EVERY DAY  FOR  PRESSURE  AND  HEART    dorzolamide-timoloL (COSOPT) 22.3-6.8 mg/mL ophthalmic solution Administer 1 Drop to both eyes two (2) times a day. aspirin delayed-release 81 mg tablet Take 1 Tab by mouth daily. multivit-mineral-iron-lutein (WOMEN'S CENTRUM SILVER WITH LUTEIN) tab tablet Take 1 Tab by mouth daily. No current facility-administered medications for this visit. ROS:  Constitutional: No fever, chills or weight loss  Respiratory: No cough, SOB   CV: No chest pain or Palpitations    Visit Vitals  BP (!) 98/48   Pulse (!) 115   Temp 97.5 °F (36.4 °C) (Temporal)   Resp 18   Ht 5' 5\" (1.651 m)   Wt 93 lb (42.2 kg)   BMI 15.48 kg/m²     Wt Readings from Last 3 Encounters:   02/21/23 93 lb (42.2 kg)   11/09/22 110 lb 2 oz (50 kg)   09/30/22 111 lb (50.3 kg)   -17#  BP Readings from Last 3 Encounters:   02/21/23 (!) 98/48   11/09/22 118/72   09/30/22 (!) 140/60     Physical Examination: General appearance - alert, thin, cachectic and ill appearing, but in no acute distress. Mental status - alert, oriented to person, place, and time  Eyes - pupils equal and reactive, extraocular eye movements intact  ENT - bilateral external ears and nose normal. Lips and gums pale  Neck - supple, no significant adenopathy, no thyromegaly or mass  Lymphatics - no palpable lymphadenopathy, no hepatosplenomegaly  Chest - clear to auscultation, no wheezes, rales or rhonchi, symmetric air entry  Heart - tachycardic rate, regular rhythm, normal S1, S2, no murmurs, rubs, clicks or gallops  Abd - decreased bowel sounds. Soft, diffuse ttp, no HSM/Mass. Extremities - cachectic with genreal loss of muscle mass. peripheral pulses rapid and thready. Nailbed pallor present.      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 HGB today is very low at 6.5. Iron level ok though, so not losing blood, but given weakness, debility, needs consideration for transfusion. Discussed case with her rad onc provider at [facility], recommended ER visit at [facility] for processing for admission. Contacted ER attending to give report at 1:09 PM 2/21/2023 and will fax notes to transfer center for review. 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 Future mgmt per them or inpatient team. Need to be aware of possibility of obstructive uropathy playing a role in AMANDA, consider pelvic imaging. Wt loss/ cachexia  Worsening. Futher mgmt per hospitalist. D/C on megace + lovenox (for VTE prophy?), but pt is severely anemic, so lovenox may not be the best choice at this time. Will defer to inpatient team. Currently on protonix for GI prophy, ajay well. Pressure sore sacrum  Skin care mgmt, no sign infection, but does have stage 2 sore. Consider tx with silvadene, pressure reliefe dressing. HTN and CKD3 with hx AMANDA  Hypotensive. Hold all BP meds for now, consider restart CCB per inpatient team.     Gastric circumferential mural thickening  Seen on CT at [facility]. DDX includes gastritis or less commonly gastric malignancy. GI consult planned for future for likely EGD, or per imaging. HLD and ASCVD  Doing well on lipitor 40mg and ASA 81mg/d. Hold ASA until anemia stable, consider restart PRN once recovering.     F/U after discharge and PRN    Time-based coding, delete if not needed: I spent at least 60 minutes with this very acute established patient, and >50% of the time was spent counseling and/or coordinating care regarding care plan and expected course    Tevin Guzman MD

## 2023-02-22 LAB — HGB BLD-MCNC: 6.5 G/DL

## 2023-02-27 ENCOUNTER — TELEPHONE (OUTPATIENT)
Dept: FAMILY MEDICINE CLINIC | Age: 88
End: 2023-02-27

## 2023-02-28 NOTE — TELEPHONE ENCOUNTER
FESTUS Call. 2/27/2023 7:26 PM : Contacted pt. She is feeling ok since discharge. Has plenty of meds. Still having a lot of dysuria, likely 2nd to radiation cystitis. Did get tx with rocephin IV while hosp for Culture POS GNR. Fatigue is better. No f/c. Has a lot of nausea still, so has to eat slowly. Rec to gently hydrate and after meals, have a shake like ensure. Pt will try. Has visit 3/3/23 with me for FESTUS.     Caren Garcia MD

## 2023-03-03 ENCOUNTER — TELEPHONE (OUTPATIENT)
Dept: FAMILY MEDICINE CLINIC | Age: 88
End: 2023-03-03

## 2023-03-03 NOTE — TELEPHONE ENCOUNTER
Contacted pt to check status, doing about the same as at discharge. Still working on nutrition. Has HH and home PT coming. Monitor.

## 2023-03-08 ENCOUNTER — TELEPHONE (OUTPATIENT)
Dept: FAMILY MEDICINE CLINIC | Age: 88
End: 2023-03-08

## 2023-03-17 ENCOUNTER — OFFICE VISIT (OUTPATIENT)
Dept: FAMILY MEDICINE CLINIC | Age: 88
End: 2023-03-17
Payer: MEDICARE

## 2023-03-17 VITALS
BODY MASS INDEX: 13.96 KG/M2 | HEIGHT: 65 IN | RESPIRATION RATE: 18 BRPM | HEART RATE: 102 BPM | TEMPERATURE: 97.1 F | SYSTOLIC BLOOD PRESSURE: 120 MMHG | WEIGHT: 83.8 LBS | DIASTOLIC BLOOD PRESSURE: 72 MMHG | OXYGEN SATURATION: 98 %

## 2023-03-17 DIAGNOSIS — R11.0 NAUSEA: ICD-10-CM

## 2023-03-17 DIAGNOSIS — C67.8 MALIGNANT NEOPLASM OF OVERLAPPING SITES OF BLADDER (HCC): ICD-10-CM

## 2023-03-17 DIAGNOSIS — N18.32 STAGE 3B CHRONIC KIDNEY DISEASE (HCC): ICD-10-CM

## 2023-03-17 DIAGNOSIS — R64 CACHEXIA (HCC): ICD-10-CM

## 2023-03-17 DIAGNOSIS — D63.8 ANEMIA OF CHRONIC DISEASE: Primary | ICD-10-CM

## 2023-03-17 DIAGNOSIS — R74.8 ABNORMAL SERUM LEVEL OF LIPASE: ICD-10-CM

## 2023-03-17 LAB — HGB BLD-MCNC: 8.3 G/DL

## 2023-03-17 PROCEDURE — 85018 HEMOGLOBIN: CPT | Performed by: FAMILY MEDICINE

## 2023-03-17 RX ORDER — PHENAZOPYRIDINE HYDROCHLORIDE 100 MG/1
100 TABLET, FILM COATED ORAL
Qty: 60 TABLET | Refills: 2 | Status: SHIPPED | OUTPATIENT
Start: 2023-03-17

## 2023-03-17 RX ORDER — ACETAMINOPHEN 500 MG
1000 TABLET ORAL
COMMUNITY
Start: 2023-02-25

## 2023-03-17 RX ORDER — SODIUM BICARBONATE 325 MG/1
650 TABLET ORAL 2 TIMES DAILY
COMMUNITY

## 2023-03-17 RX ORDER — FOLIC ACID 1 MG/1
1 TABLET ORAL DAILY
COMMUNITY

## 2023-03-17 RX ORDER — MIRTAZAPINE 7.5 MG/1
7.5 TABLET, FILM COATED ORAL
Qty: 30 TABLET | Refills: 11 | Status: SHIPPED | OUTPATIENT
Start: 2023-03-17

## 2023-03-17 RX ORDER — ONDANSETRON 4 MG/1
4 TABLET, FILM COATED ORAL
Qty: 60 TABLET | Refills: 3 | Status: SHIPPED | OUTPATIENT
Start: 2023-03-17

## 2023-03-17 RX ORDER — CIPROFLOXACIN 500 MG/1
TABLET ORAL
COMMUNITY
Start: 2023-03-08

## 2023-03-17 RX ORDER — PREDNISONE 10 MG/1
TABLET ORAL
COMMUNITY
Start: 2023-02-28

## 2023-03-17 RX ORDER — PHENAZOPYRIDINE HYDROCHLORIDE 100 MG/1
100 TABLET, FILM COATED ORAL
Qty: 60 TABLET | Refills: 2 | Status: SHIPPED | OUTPATIENT
Start: 2023-03-17 | End: 2023-03-17 | Stop reason: SDUPTHER

## 2023-03-17 RX ORDER — LANOLIN ALCOHOL/MO/W.PET/CERES
100 CREAM (GRAM) TOPICAL DAILY
COMMUNITY

## 2023-03-17 NOTE — PROGRESS NOTES
Adarsh Odom is a 80 y.o. female     Chief Complaint   Patient presents with    Hospital Follow Up     [facility] 3/5/23 Sepsis/Ureteral Stone     HPI:  Anemia of chronic disease   HGB dropping since last visit. Hgb: 8.4 -> 7.4 -> 8.7->9.1->8.3 (3/17/2023)  Reticulocyte Count 4.3%, Ferritin 883, Iron 54. Urinary frequency/ Bladder cancer  Had eval with urology 12/2022, DR. Sherren Masters. Had cystoscopy, bx showed high grade urothelial ca. Sent for palliative radiation therapy. Eventually developed dehydration, weakness, radiation proctitis, and was recommended for admission by rad onc 2/16/23, dx with AMANDA, Anemia, weakness. Hypertension/ CKD 3b. Blood pressures ok today. Current regimen: ARB, beta-blocker, calcium channel blocker. Symptoms include weakness. Lab review:   Lab Results   Component Value Date/Time    Sodium 141 12/17/2021 11:06 AM    Potassium 3.8 12/17/2021 11:06 AM    Chloride 110 (H) 12/17/2021 11:06 AM    CO2 25 12/17/2021 11:06 AM    Anion gap 6 12/17/2021 11:06 AM    Glucose 99 12/17/2021 11:06 AM    BUN 25 (H) 12/17/2021 11:06 AM    Creatinine 1.62 (H) 12/17/2021 11:06 AM    BUN/Creatinine ratio 15 12/17/2021 11:06 AM    GFR est AA 37 (L) 12/17/2021 11:06 AM    GFR est non-AA 30 (L) 12/17/2021 11:06 AM    Calcium 9.6 12/17/2021 11:06 AM     Hyperlipidemia. On lipitor 80mg. Taran well. No myalgias, arthralgias, unusual weakness. Pretty much in goal on 9/2021 check. Lab Results   Component Value Date/Time    Cholesterol, total 190 09/14/2021 12:15 PM    HDL Cholesterol 58 09/14/2021 12:15 PM    LDL, calculated 103.6 (H) 09/14/2021 12:15 PM    VLDL, calculated 28.4 09/14/2021 12:15 PM    Triglyceride 142 09/14/2021 12:15 PM    CHOL/HDL Ratio 3.3 09/14/2021 12:15 PM     Lab Results   Component Value Date/Time    ALT (SGPT) 25 09/14/2021 12:15 PM    Alk. phosphatase 72 09/14/2021 12:15 PM    Bilirubin, total 0.5 09/14/2021 12:15 PM     PMH, SH, Medications/Allergies: reviewed, on chart.   Current Outpatient Medications   Medication Sig    acetaminophen (TYLENOL) 500 mg tablet Take 1,000 mg by mouth three (3) times daily as needed. pantoprazole (PROTONIX) 40 mg granules for oral suspension 40 mg daily. phenazopyridine (PYRIDIUM) 100 mg tablet Take  by mouth three (3) times daily (after meals). megestroL (MEGACE) 400 mg/10 mL (40 mg/mL) suspension Take 200 mg by mouth daily. Take 20ml every day for 12 doses. metoprolol succinate (TOPROL-XL) 50 mg XL tablet TAKE 1 TABLET EVERY DAY    estradioL (ESTRACE) 0.5 mg tablet 1/4 tab vaginally once weekly  Indications: menopausal changes    valsartan (DIOVAN) 80 mg tablet Take 1 Tablet by mouth two (2) times a day. Indications: pressure    atorvastatin (LIPITOR) 40 mg tablet TAKE 1 TABLET EVERY DAY FOR CHOLESTEROL, HEART (NEW DOSE)    amLODIPine (NORVASC) 5 mg tablet TAKE 1 TABLET EVERY DAY  FOR  PRESSURE  AND  HEART    dorzolamide-timoloL (COSOPT) 22.3-6.8 mg/mL ophthalmic solution Administer 1 Drop to both eyes two (2) times a day. aspirin delayed-release 81 mg tablet Take 1 Tab by mouth daily. multivit-mineral-iron-lutein (WOMEN'S CENTRUM SILVER WITH LUTEIN) tab tablet Take 1 Tab by mouth daily. ciprofloxacin HCl (CIPRO) 500 mg tablet     predniSONE (DELTASONE) 10 mg tablet     sodium bicarbonate 325 mg tablet Take 650 mg by mouth two (2) times a day. thiamine HCL (B-1) 100 mg tablet Take 100 mg by mouth daily. folic acid (FOLVITE) 1 mg tablet Take 1 mg by mouth daily. No current facility-administered medications for this visit.       ROS:  Constitutional: No fever, chills or weight loss  Respiratory: No cough, SOB   CV: No chest pain or Palpitations    Visit Vitals  /72   Pulse (!) 102   Temp 97.1 °F (36.2 °C) (Temporal)   Resp 18   Ht 5' 5\" (1.651 m)   Wt 83 lb 12.8 oz (38 kg)   SpO2 98%   BMI 13.95 kg/m²     Wt Readings from Last 3 Encounters:   03/17/23 83 lb 12.8 oz (38 kg)   02/21/23 93 lb (42.2 kg)   11/09/22 110 lb 2 oz (50 kg)   -10#  BP Readings from Last 3 Encounters:   03/17/23 120/72   02/21/23 (!) 98/48   11/09/22 118/72     Physical Examination: General appearance - alert, thin, cachectic and ill appearing, but in no acute distress. Mental status - alert, oriented to person, place, and time  Eyes - pupils equal and reactive, extraocular eye movements intact  ENT - bilateral external ears and nose normal. Lips and gums pale  Neck - supple, no significant adenopathy, no thyromegaly or mass  Lymphatics - no palpable lymphadenopathy, no hepatosplenomegaly  Chest - clear to auscultation, no wheezes, rales or rhonchi, symmetric air entry  Heart - tachycardic rate, regular rhythm, normal S1, S2, no murmurs, rubs, clicks or gallops  Abd - decreased bowel sounds. Soft, diffuse ttp, no HSM/Mass. Extremities - cachectic with genreal loss of muscle mass. peripheral pulses rapid and thready. Nailbed pallor present. 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 Now down again to 8.1. Last Iron level ok though, so not losing blood, but given weakness, debility, needs consideration for transfusion. Discussed case with her rad onc provider at [facility], recommended ER visit at [facility] for processing for admission. Contacted ER attending to give report at 1:09 PM 3/17/2023 and will fax notes to transfer center for review. Wt loss/ cachexia  Worsening. Futher mgmt per hospitalist. D/C on megace + lovenox (for VTE prophy?), but pt is severely anemic, so lovenox may not be the best choice at this time. Will defer to inpatient team. Currently on protonix for GI prophy, ajay well. 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 Need to be aware of possibility of obstructive uropathy playing a role in AMANDA, consider pelvic imaging.     Pressure sore sacrum  Skin care mgmt, no sign infection, but does have stage 2 sore. Consider tx with silvadene, pressure reliefe dressing. HTN and CKD3 with hx AMANDA  Hypotensive. Hold all BP meds for now, consider restart CCB per inpatient team.     Gastric circumferential mural thickening  Se[facility] on [facility] at [facility]. DDX includes gastritis or less commonly gastric malignancy. GI consult planned for future for likely EGD, or per imaging. HLD and ASCVD  Doing well on lipitor 40mg and ASA 81mg/d. Hold ASA until anemia stable, consider restart PRN once recovering.     F/U after discharge and PRN    Time-based coding, delete if not needed: I spent at least 60 minutes with this very acute established patient, and >50% of the time was spent counseling and/or coordinating care regarding care plan and expected course    Sylvester Reno MD

## 2023-04-06 ENCOUNTER — TELEPHONE (OUTPATIENT)
Dept: FAMILY MEDICINE CLINIC | Age: 88
End: 2023-04-06